# Patient Record
Sex: MALE | Race: WHITE | NOT HISPANIC OR LATINO | Employment: FULL TIME | URBAN - NONMETROPOLITAN AREA
[De-identification: names, ages, dates, MRNs, and addresses within clinical notes are randomized per-mention and may not be internally consistent; named-entity substitution may affect disease eponyms.]

---

## 2019-07-03 ENCOUNTER — OFFICE VISIT (OUTPATIENT)
Dept: FAMILY MEDICINE CLINIC | Facility: CLINIC | Age: 59
End: 2019-07-03
Payer: COMMERCIAL

## 2019-07-03 VITALS
SYSTOLIC BLOOD PRESSURE: 140 MMHG | BODY MASS INDEX: 36.49 KG/M2 | DIASTOLIC BLOOD PRESSURE: 76 MMHG | WEIGHT: 293.5 LBS | HEIGHT: 75 IN

## 2019-07-03 DIAGNOSIS — K21.9 GASTROESOPHAGEAL REFLUX DISEASE WITHOUT ESOPHAGITIS: ICD-10-CM

## 2019-07-03 DIAGNOSIS — I10 ESSENTIAL HYPERTENSION, BENIGN: Primary | ICD-10-CM

## 2019-07-03 DIAGNOSIS — Z12.11 COLON CANCER SCREENING: ICD-10-CM

## 2019-07-03 DIAGNOSIS — R35.1 NOCTURIA: ICD-10-CM

## 2019-07-03 DIAGNOSIS — R73.01 IMPAIRED FASTING GLUCOSE: ICD-10-CM

## 2019-07-03 DIAGNOSIS — E78.5 DYSLIPIDEMIA: ICD-10-CM

## 2019-07-03 DIAGNOSIS — M51.36 LUMBAR DEGENERATIVE DISC DISEASE: ICD-10-CM

## 2019-07-03 PROBLEM — M51.369 LUMBAR DEGENERATIVE DISC DISEASE: Status: ACTIVE | Noted: 2019-07-03

## 2019-07-03 PROCEDURE — 99213 OFFICE O/P EST LOW 20 MIN: CPT | Performed by: FAMILY MEDICINE

## 2019-07-03 RX ORDER — OMEPRAZOLE 20 MG/1
20 TABLET, DELAYED RELEASE ORAL DAILY
COMMUNITY

## 2019-07-03 RX ORDER — AMLODIPINE BESYLATE 2.5 MG/1
2.5 TABLET ORAL DAILY
COMMUNITY
End: 2019-12-10 | Stop reason: SDUPTHER

## 2019-07-03 RX ORDER — LISINOPRIL 40 MG/1
40 TABLET ORAL DAILY
COMMUNITY
End: 2019-09-29 | Stop reason: SDUPTHER

## 2019-07-03 NOTE — PATIENT INSTRUCTIONS
Low-Sodium Diet   AMBULATORY CARE:   A low-sodium diet  limits foods that are high in sodium (salt)  You will need to follow a low-sodium diet if you have high blood pressure, kidney disease, or heart failure  You may also need to follow this diet if you have a condition that is causing your body to retain (hold) extra fluid  You may need to limit the amount of sodium you eat to 1,500 mg  Ask your healthcare provider how much sodium you can have each day  How to use food labels to choose foods that are low in sodium:  Read food labels to find the amount of sodium they contain  The amount of sodium is listed in milligrams (mg)  The % Daily Value (DV) column tells you how much of your daily needs are met by 1 serving of the food for each nutrient listed  Choose foods that have less than 5% of the DV of sodium  These foods are considered low in sodium  Foods that have 20% or more of the DV of sodium are considered high in sodium  Some food labels may also list any of the following terms that tell you about the sodium content in the food:  · Sodium-free:  Less than 5 mg in each serving    · Very low sodium:  35 mg of sodium or less in each serving    · Low sodium:  140 mg of sodium or less in each serving    · Reduced sodium: At least 25% less sodium in each serving than the regular type    · Light in sodium:  50% less sodium in each serving    · Unsalted or no added salt:  No extra salt is added during processing (the food may still contain sodium)  Foods to avoid:  Salty foods are high in sodium   You should avoid the following:  · Processed foods:      ¨ Mixes for cornbread, biscuits, cake, and pudding     ¨ Instant foods, such as potatoes, cereals, noodles, and rice     ¨ Packaged foods, such as bread stuffing, rice and pasta mixes, snack dip mixes, and macaroni and cheese     ¨ Canned foods, such as canned vegetables, soups, broths, sauces, and vegetable or tomato juice    ¨ Snack foods, such as salted chips, popcorn, pretzels, pork rinds, salted crackers, and salted nuts    ¨ Frozen foods, such as dinners, entrees, vegetables with sauces, and breaded meats    ¨ Sauerkraut, pickled vegetables, and other foods prepared in brine    · Meats and cheeses:      ¨ Smoked or cured meat, such as corned beef, millan, ham, hot dogs, and sausage    ¨ Canned meats or spreads, such as potted meats, sardines, anchovies, and imitation seafood    ¨ Deli or lunch meats, such as bologna, ham, turkey, and roast beef    ¨ Processed cheese, such as American cheese and cheese spreads    · Condiments, sauces, and seasonings:      ¨ Salt (¼ teaspoon of salt contains 575 mg of sodium)    ¨ Seasonings made with salt, such as garlic salt, celery salt, onion salt, and seasoned salt    ¨ Regular soy sauce, barbecue sauce, teriyaki sauce, steak sauce, Worcestershire sauce, and most flavored vinegars    ¨ Canned gravy and mixes     ¨ Regular condiments, such as mustard, ketchup, and salad dressings    ¨ Pickles and olives    ¨ Meat tenderizers and monosodium glutamate (MSG)  Foods to include:  Read the food label to find the exact amount of sodium in each serving  · Bread and cereal:  Try to choose breads with less than 80 mg of sodium per serving  ¨ Bread, roll, lauren, tortilla, or unsalted crackers  ¨ Ready-to-eat cereals with less than 5% DV of sodium (examples include shredded wheat and puffed rice)    ¨ Pasta    · Vegetables and fruits:      ¨ Unsalted fresh, frozen, or canned vegetables    ¨ Fresh, frozen, or canned fruits    ¨ Fruit juice    · Dairy:  One serving has about 150 mg of sodium  ¨ Milk, all types    ¨ Yogurt    ¨ Hard cheese, such as cheddar, Swiss, [employer], or mozzarella    · Meat and other protein foods:  Some raw meats may have added sodium       ¨ Plain meats, fish, and poultry     ¨ Eggs    · Other foods:      ¨ Homemade pudding    ¨ Unsalted nuts, popcorn, or pretzels    ¨ Unsalted butter or margarine  Ways to decrease sodium:   · Add spices and herbs to foods instead of salt during cooking  Use salt-free seasonings to add flavor to foods  Examples include onion powder, garlic powder, basil, blevins powder, paprika, and parsley  Try lemon or lime juice or vinegar to give foods a tart flavor  Use hot peppers, pepper, or cayenne pepper to add a spicy flavor to foods  · Do not keep a salt shaker at your kitchen table  This may help keep you from adding salt to food at the table  It may take time to get used to enjoying the natural flavor of food instead of adding salt  Talk to your healthcare provider before you use salt substitutes  Some salt substitutes have a high amount of potassium and need to be avoided if you have kidney disease  · Choose low-sodium foods at restaurants  Meals from restaurants are often high in sodium  Some restaurants have nutrition information on the menu that tells you the amount of sodium in their foods  If possible, ask for your food to be prepared with less, or no salt  · Shop for unsalted or low-sodium foods and snacks at the grocery store  Examples include unsalted or low-sodium broths, soups, and canned vegetables  Choose fresh or frozen vegetables instead  Choose unsalted nuts or seeds or fresh fruits or vegetables as snacks  Read food labels and choose salt-free, very low-sodium, or low-sodium foods  © 2017 [address] Information is for End User's use only and may not be sold, redistributed or otherwise used for commercial purposes  All illustrations and images included in CareNotes® are the copyrighted property of A D A M [employer] Inc  or Marin Ariza  The above information is an  only  It is not intended as medical advice for individual conditions or treatments  Talk to your doctor, nurse or pharmacist before following any medical regimen to see if it is safe and effective for you

## 2019-07-03 NOTE — PROGRESS NOTES
Assessment/Plan:    Essential hypertension, benign  Blood pressure under fair control  Patient has done better on 5 mg of amlodipine but had lower extremity edema which was in time  For now, continue amlodipine 2 5 mg daily and lisinopril 40 mg daily I encouraged the patient to lose weight  I believe if he loses the weight he had gained, he should be able to get his blood pressure down to a reasonable level  Gastroesophageal reflux disease without esophagitis  Patient has gastro reflux disease which is stable on over-the-counter Prilosec  Lumbar degenerative disc disease  Lumbar degenerative disc disease is stable       Diagnoses and all orders for this visit:    Essential hypertension, benign  -     Comprehensive metabolic panel; Future    Colon cancer screening  -     Occult Blood, Fecal, IA; Future    Impaired fasting glucose  -     Comprehensive metabolic panel; Future  -     HEMOGLOBIN A1C W/ EAG ESTIMATION; Future    Dyslipidemia  -     Lipid panel; Future    Nocturia  -     PSA Total, Diagnostic; Future    Gastroesophageal reflux disease without esophagitis    Lumbar degenerative disc disease    Other orders  -     lisinopril (ZESTRIL) 40 mg tablet; Take 40 mg by mouth daily  -     amLODIPine (NORVASC) 2 5 mg tablet; Take 2 5 mg by mouth daily  -     fluticasone (VERAMYST) 27 5 MCG/SPRAY nasal spray; 2 sprays into each nostril daily  -     omeprazole (PriLOSEC OTC) 20 MG tablet; Take 20 mg by mouth daily          I again recommended a screening colonoscopy for the patient but he declined  His main reason is because of transportation I ordered a stool immunoassay  Subjective:      Patient ID: Mari Denny is a 62 y o  male  This patient is a 43-year-old white male presents to the office today for his routine checkup  Doing well and has no complaints  He has changed jobs since I last saw him  He is still doing  he is working less hours now admitted for money    He tells me he is in the process of moving  He purchase to the house  He is struggling with his weight  He is taking his medication as prescribed  The for exercise  His back has been feeling well lately  He has not management now for 3 years  He uses an inversion table for about 5 minutes every evening  The following portions of the patient's history were reviewed and updated as appropriate: allergies, current medications, past family history, past medical history, past social history, past surgical history and problem list     Review of Systems   Respiratory: Negative  Cardiovascular: Negative  Gastrointestinal: Negative  Objective:      /76   Ht 6' 3" (1 905 m)   Wt 133 kg (293 lb 8 oz)   BMI 36 68 kg/m²          Physical Exam   Constitutional:   Patient is a pleasant 59-year-old white male who appears his stated age  He was in no distress  HENT:   Head: Normocephalic and atraumatic  Right Ear: External ear normal    Left Ear: External ear normal    Mouth/Throat: Oropharynx is clear and moist    Tympanic membranes were clear   Eyes: Pupils are equal, round, and reactive to light  Conjunctivae are normal    Neck: Neck supple  No tracheal deviation present  No thyromegaly present  There were no carotid bruits   Cardiovascular: Normal rate, regular rhythm, normal heart sounds and intact distal pulses  Exam reveals no gallop and no friction rub  No murmur heard  Pulmonary/Chest: Effort normal and breath sounds normal  No stridor  No respiratory distress  He has no wheezes  He has no rales  Abdominal: Soft  Bowel sounds are normal  He exhibits no distension and no mass  There is no tenderness  There is no rebound and no guarding  There was no organomegaly present   Lymphadenopathy:     He has no cervical adenopathy  Skin: Skin is warm and dry  No rash noted  Vitals reviewed        Extremities:  Without cyanosis, clubbing, or edema

## 2019-07-04 NOTE — ASSESSMENT & PLAN NOTE
Blood pressure under fair control  Patient has done better on 5 mg of amlodipine but had lower extremity edema which was in time  For now, continue amlodipine 2 5 mg daily and lisinopril 40 mg daily I encouraged the patient to lose weight  I believe if he loses the weight he had gained, he should be able to get his blood pressure down to a reasonable level

## 2019-09-29 DIAGNOSIS — I10 ESSENTIAL HYPERTENSION, BENIGN: Primary | ICD-10-CM

## 2019-09-29 RX ORDER — LISINOPRIL 40 MG/1
TABLET ORAL
Qty: 90 TABLET | Refills: 2 | Status: SHIPPED | OUTPATIENT
Start: 2019-09-29 | End: 2020-05-06 | Stop reason: SDUPTHER

## 2019-12-10 DIAGNOSIS — I10 ESSENTIAL HYPERTENSION, BENIGN: Primary | ICD-10-CM

## 2019-12-10 RX ORDER — AMLODIPINE BESYLATE 2.5 MG/1
TABLET ORAL
Qty: 90 TABLET | Refills: 1 | Status: SHIPPED | OUTPATIENT
Start: 2019-12-10 | End: 2020-05-06 | Stop reason: SDUPTHER

## 2020-05-06 DIAGNOSIS — I10 ESSENTIAL HYPERTENSION, BENIGN: ICD-10-CM

## 2020-05-06 RX ORDER — AMLODIPINE BESYLATE 2.5 MG/1
2.5 TABLET ORAL DAILY
Qty: 90 TABLET | Refills: 0 | Status: SHIPPED | OUTPATIENT
Start: 2020-05-06 | End: 2020-06-08 | Stop reason: DRUGHIGH

## 2020-05-06 RX ORDER — LISINOPRIL 40 MG/1
40 TABLET ORAL DAILY
Qty: 90 TABLET | Refills: 0 | Status: SHIPPED | OUTPATIENT
Start: 2020-05-06 | End: 2020-06-08 | Stop reason: SDUPTHER

## 2020-06-08 ENCOUNTER — OFFICE VISIT (OUTPATIENT)
Dept: FAMILY MEDICINE CLINIC | Facility: CLINIC | Age: 60
End: 2020-06-08
Payer: COMMERCIAL

## 2020-06-08 VITALS
OXYGEN SATURATION: 99 % | DIASTOLIC BLOOD PRESSURE: 82 MMHG | BODY MASS INDEX: 37.1 KG/M2 | SYSTOLIC BLOOD PRESSURE: 140 MMHG | HEIGHT: 75 IN | TEMPERATURE: 97.5 F | WEIGHT: 298.4 LBS | HEART RATE: 75 BPM

## 2020-06-08 DIAGNOSIS — Z12.5 PROSTATE CANCER SCREENING: ICD-10-CM

## 2020-06-08 DIAGNOSIS — Z12.11 COLON CANCER SCREENING: ICD-10-CM

## 2020-06-08 DIAGNOSIS — E78.5 DYSLIPIDEMIA: ICD-10-CM

## 2020-06-08 DIAGNOSIS — R53.83 OTHER FATIGUE: ICD-10-CM

## 2020-06-08 DIAGNOSIS — R73.01 IMPAIRED FASTING GLUCOSE: ICD-10-CM

## 2020-06-08 DIAGNOSIS — I10 ESSENTIAL HYPERTENSION, BENIGN: Primary | ICD-10-CM

## 2020-06-08 PROCEDURE — 1036F TOBACCO NON-USER: CPT | Performed by: FAMILY MEDICINE

## 2020-06-08 PROCEDURE — 3077F SYST BP >= 140 MM HG: CPT | Performed by: FAMILY MEDICINE

## 2020-06-08 PROCEDURE — 99213 OFFICE O/P EST LOW 20 MIN: CPT | Performed by: FAMILY MEDICINE

## 2020-06-08 PROCEDURE — 3079F DIAST BP 80-89 MM HG: CPT | Performed by: FAMILY MEDICINE

## 2020-06-08 PROCEDURE — 3008F BODY MASS INDEX DOCD: CPT | Performed by: FAMILY MEDICINE

## 2020-06-08 RX ORDER — LISINOPRIL 40 MG/1
40 TABLET ORAL DAILY
Qty: 90 TABLET | Refills: 2 | Status: SHIPPED | OUTPATIENT
Start: 2020-06-08 | End: 2021-02-19 | Stop reason: SDUPTHER

## 2020-06-08 RX ORDER — AMLODIPINE BESYLATE 5 MG/1
5 TABLET ORAL DAILY
Qty: 90 TABLET | Refills: 2 | Status: SHIPPED | OUTPATIENT
Start: 2020-06-08 | End: 2020-12-07 | Stop reason: SDUPTHER

## 2020-09-04 ENCOUNTER — APPOINTMENT (OUTPATIENT)
Dept: LAB | Facility: HOSPITAL | Age: 60
End: 2020-09-04
Payer: COMMERCIAL

## 2020-09-04 DIAGNOSIS — Z12.5 PROSTATE CANCER SCREENING: ICD-10-CM

## 2020-09-04 DIAGNOSIS — R73.01 IMPAIRED FASTING GLUCOSE: ICD-10-CM

## 2020-09-04 DIAGNOSIS — E78.5 DYSLIPIDEMIA: ICD-10-CM

## 2020-09-04 DIAGNOSIS — R53.83 OTHER FATIGUE: ICD-10-CM

## 2020-09-04 LAB
ALBUMIN SERPL BCP-MCNC: 4 G/DL (ref 3.5–5)
ALP SERPL-CCNC: 61 U/L (ref 46–116)
ALT SERPL W P-5'-P-CCNC: 59 U/L (ref 12–78)
ANION GAP SERPL CALCULATED.3IONS-SCNC: 6 MMOL/L (ref 4–13)
AST SERPL W P-5'-P-CCNC: 47 U/L (ref 5–45)
BILIRUB SERPL-MCNC: 1.48 MG/DL (ref 0.2–1)
BUN SERPL-MCNC: 15 MG/DL (ref 5–25)
CALCIUM SERPL-MCNC: 9.6 MG/DL (ref 8.3–10.1)
CHLORIDE SERPL-SCNC: 104 MMOL/L (ref 100–108)
CHOLEST SERPL-MCNC: 163 MG/DL (ref 50–200)
CO2 SERPL-SCNC: 30 MMOL/L (ref 21–32)
CREAT SERPL-MCNC: 1.07 MG/DL (ref 0.6–1.3)
EST. AVERAGE GLUCOSE BLD GHB EST-MCNC: 123 MG/DL
GFR SERPL CREATININE-BSD FRML MDRD: 75 ML/MIN/1.73SQ M
GLUCOSE P FAST SERPL-MCNC: 133 MG/DL (ref 65–99)
HBA1C MFR BLD: 5.9 %
HDLC SERPL-MCNC: 30 MG/DL
LDLC SERPL CALC-MCNC: 111 MG/DL (ref 0–100)
NONHDLC SERPL-MCNC: 133 MG/DL
POTASSIUM SERPL-SCNC: 4.3 MMOL/L (ref 3.5–5.3)
PROT SERPL-MCNC: 7.6 G/DL (ref 6.4–8.2)
PSA SERPL-MCNC: 1.8 NG/ML (ref 0–4)
SODIUM SERPL-SCNC: 140 MMOL/L (ref 136–145)
TRIGL SERPL-MCNC: 110 MG/DL
TSH SERPL DL<=0.05 MIU/L-ACNC: 1.89 UIU/ML (ref 0.36–3.74)

## 2020-09-04 PROCEDURE — 80061 LIPID PANEL: CPT

## 2020-09-04 PROCEDURE — 83036 HEMOGLOBIN GLYCOSYLATED A1C: CPT

## 2020-09-04 PROCEDURE — 84443 ASSAY THYROID STIM HORMONE: CPT

## 2020-09-04 PROCEDURE — 36415 COLL VENOUS BLD VENIPUNCTURE: CPT

## 2020-09-04 PROCEDURE — G0103 PSA SCREENING: HCPCS

## 2020-09-04 PROCEDURE — 80053 COMPREHEN METABOLIC PANEL: CPT

## 2020-12-07 DIAGNOSIS — I10 ESSENTIAL HYPERTENSION, BENIGN: ICD-10-CM

## 2020-12-07 RX ORDER — AMLODIPINE BESYLATE 5 MG/1
5 TABLET ORAL DAILY
Qty: 90 TABLET | Refills: 3 | Status: SHIPPED | OUTPATIENT
Start: 2020-12-07 | End: 2021-11-09

## 2020-12-11 ENCOUNTER — OFFICE VISIT (OUTPATIENT)
Dept: FAMILY MEDICINE CLINIC | Facility: CLINIC | Age: 60
End: 2020-12-11
Payer: COMMERCIAL

## 2020-12-11 VITALS
TEMPERATURE: 97.5 F | WEIGHT: 305.4 LBS | SYSTOLIC BLOOD PRESSURE: 142 MMHG | HEART RATE: 80 BPM | DIASTOLIC BLOOD PRESSURE: 78 MMHG | OXYGEN SATURATION: 93 % | HEIGHT: 75 IN | BODY MASS INDEX: 37.97 KG/M2

## 2020-12-11 DIAGNOSIS — E78.5 DYSLIPIDEMIA: ICD-10-CM

## 2020-12-11 DIAGNOSIS — R73.01 IMPAIRED FASTING GLUCOSE: ICD-10-CM

## 2020-12-11 DIAGNOSIS — I10 ESSENTIAL HYPERTENSION, BENIGN: Primary | ICD-10-CM

## 2020-12-11 DIAGNOSIS — Z12.11 COLON CANCER SCREENING: ICD-10-CM

## 2020-12-11 PROCEDURE — 99213 OFFICE O/P EST LOW 20 MIN: CPT | Performed by: FAMILY MEDICINE

## 2021-02-19 DIAGNOSIS — I10 ESSENTIAL HYPERTENSION, BENIGN: ICD-10-CM

## 2021-02-19 RX ORDER — LISINOPRIL 40 MG/1
40 TABLET ORAL DAILY
Qty: 90 TABLET | Refills: 2 | Status: SHIPPED | OUTPATIENT
Start: 2021-02-19 | End: 2021-10-25

## 2021-04-08 ENCOUNTER — TELEPHONE (OUTPATIENT)
Dept: FAMILY MEDICINE CLINIC | Facility: CLINIC | Age: 61
End: 2021-04-08

## 2021-04-08 NOTE — TELEPHONE ENCOUNTER
Called patient regarding over due labs  Left message for patient to have labs done sometime in the next few days

## 2021-06-05 ENCOUNTER — RA CDI HCC (OUTPATIENT)
Dept: OTHER | Facility: HOSPITAL | Age: 61
End: 2021-06-05

## 2021-06-05 NOTE — PROGRESS NOTES
[facility] 75  coding opportunities          Chart reviewed, no opportunity found: CHART REVIEWED, NO OPPORTUNITY FOUND              Patients insurance company:  [employer] [facility] (Medicare Advantage and Commercial)

## 2021-06-11 ENCOUNTER — OFFICE VISIT (OUTPATIENT)
Dept: FAMILY MEDICINE CLINIC | Facility: CLINIC | Age: 61
End: 2021-06-11
Payer: COMMERCIAL

## 2021-06-11 VITALS
HEART RATE: 77 BPM | OXYGEN SATURATION: 98 % | WEIGHT: 302.8 LBS | SYSTOLIC BLOOD PRESSURE: 136 MMHG | HEIGHT: 75 IN | TEMPERATURE: 97.2 F | BODY MASS INDEX: 37.65 KG/M2 | DIASTOLIC BLOOD PRESSURE: 78 MMHG

## 2021-06-11 DIAGNOSIS — N52.9 IMPOTENCE OF ORGANIC ORIGIN: ICD-10-CM

## 2021-06-11 DIAGNOSIS — N40.1 BENIGN PROSTATIC HYPERPLASIA WITH NOCTURIA: ICD-10-CM

## 2021-06-11 DIAGNOSIS — J30.1 SEASONAL ALLERGIC RHINITIS DUE TO POLLEN: ICD-10-CM

## 2021-06-11 DIAGNOSIS — R35.1 BENIGN PROSTATIC HYPERPLASIA WITH NOCTURIA: ICD-10-CM

## 2021-06-11 DIAGNOSIS — R73.01 IMPAIRED FASTING GLUCOSE: ICD-10-CM

## 2021-06-11 DIAGNOSIS — I10 ESSENTIAL HYPERTENSION, BENIGN: Primary | ICD-10-CM

## 2021-06-11 PROCEDURE — 99214 OFFICE O/P EST MOD 30 MIN: CPT | Performed by: FAMILY MEDICINE

## 2021-06-11 PROCEDURE — 3008F BODY MASS INDEX DOCD: CPT | Performed by: FAMILY MEDICINE

## 2021-06-11 PROCEDURE — 3725F SCREEN DEPRESSION PERFORMED: CPT | Performed by: FAMILY MEDICINE

## 2021-06-11 PROCEDURE — 3078F DIAST BP <80 MM HG: CPT | Performed by: FAMILY MEDICINE

## 2021-06-11 PROCEDURE — 1036F TOBACCO NON-USER: CPT | Performed by: FAMILY MEDICINE

## 2021-06-11 PROCEDURE — 3075F SYST BP GE 130 - 139MM HG: CPT | Performed by: FAMILY MEDICINE

## 2021-06-11 RX ORDER — SILDENAFIL 100 MG/1
100 TABLET, FILM COATED ORAL DAILY PRN
Qty: 10 TABLET | Refills: 3 | Status: SHIPPED | OUTPATIENT
Start: 2021-06-11

## 2021-06-11 NOTE — PATIENT INSTRUCTIONS
Low-Sodium Diet   AMBULATORY CARE:   A low-sodium diet  limits foods that are high in sodium (salt)  You will need to follow a low-sodium diet if you have high blood pressure, kidney disease, or heart failure  You may also need to follow this diet if you have a condition that is causing your body to retain (hold) extra fluid  You may need to limit the amount of sodium you eat in a day to 1,500 to 2,000 mg  Ask your healthcare provider how much sodium you can have each day  How to use food labels to choose foods that are low in sodium:  Read food labels to find the amount of sodium they contain  The amount of sodium is listed in milligrams (mg)  The % Daily Value (DV) column tells you how much of your daily needs are met by 1 serving of the food for each nutrient listed  Choose foods that have less than 5% of the DV of sodium  These foods are considered low in sodium  Foods that have 20% or more of the DV of sodium are considered high in sodium  Some food labels may also list any of the following terms that tell you about the sodium content in the food:  · Sodium-free:  Less than 5 mg in each serving    · Very low sodium:  35 mg of sodium or less in each serving    · Low sodium:  140 mg of sodium or less in each serving    · Reduced sodium: At least 25% less sodium in each serving than the regular type    · Light in sodium:  50% less sodium in each serving    · Unsalted or no added salt:  No extra salt is added during processing (the food may still contain sodium)     Foods to avoid:  Salty foods are high in sodium  You should avoid the following:  · Processed foods:      ? Mixes for cornbread, biscuits, cake, and pudding     ? Instant foods, such as potatoes, cereals, noodles, and rice     ? Packaged foods, such as bread stuffing, rice and pasta mixes, snack dip mixes, and macaroni and cheese     ? Canned foods, such as canned vegetables, soups, broths, sauces, and vegetable or tomato juice    ?  Snack foods, such as salted chips, popcorn, pretzels, pork rinds, salted crackers, and salted nuts    ? Frozen foods, such as dinners, entrees, vegetables with sauces, and breaded meats    ? Sauerkraut, pickled vegetables, and other foods prepared in brine    · Meats and cheeses:      ? Smoked or cured meat, such as corned beef, millan, ham, hot dogs, and sausage    ? Canned meats or spreads, such as potted meats, sardines, anchovies, and imitation seafood    ? Deli or lunch meats, such as bologna, ham, turkey, and roast beef    ? Processed cheese, such as American cheese and cheese spreads    · Condiments, sauces, and seasonings:      ? Salt (¼ teaspoon of salt contains 575 mg of sodium)    ? Seasonings made with salt, such as garlic salt, celery salt, onion salt, and seasoned salt    ? Regular soy sauce, barbecue sauce, teriyaki sauce, steak sauce, Worcestershire sauce, and most flavored vinegars    ? Canned gravy and mixes     ? Regular condiments, such as mustard, ketchup, and salad dressings    ? Pickles and olives    ? Meat tenderizers and monosodium glutamate (MSG)    Foods to include:  Read the food label to find the exact amount of sodium in each serving  · Bread and cereal:  Try to choose breads with less than 80 mg of sodium per serving  ? Bread, roll, lauren, tortilla, or unsalted crackers  ? Ready-to-eat cereals with less than 5% DV of sodium (examples include shredded wheat and puffed rice)    ? Pasta    · Vegetables and fruits:      ? Unsalted fresh, frozen, or canned vegetables    ? Fresh, frozen, or canned fruits    ? Fruit juice    · Dairy:  One serving has about 150 mg of sodium  ? Milk, all types    ? Yogurt    ? Hard cheese, such as cheddar, Swiss, [employer], or mozzarella    · Meat and other protein foods:  Some raw meats may have added sodium  ? Plain meats, fish, and poultry     ? Eggs    · Other foods:      ? Homemade pudding    ? Unsalted nuts, popcorn, or pretzels    ?  Unsalted butter or margarine    Ways to decrease sodium:   · Add spices and herbs to foods instead of salt during cooking  Use salt-free seasonings to add flavor to foods  Examples include onion powder, garlic powder, basil, blevins powder, paprika, and parsley  Try lemon or lime juice or vinegar to give foods a tart flavor  Use hot peppers, pepper, or cayenne pepper to add a spicy flavor  · Do not keep a salt shaker at your kitchen table  This may help keep you from adding salt to food at the table  A teaspoon of salt has 2,300 mg of sodium  It may take time to get used to enjoying the natural flavor of food instead of adding salt  Talk to your healthcare provider before you use salt substitutes  Some salt substitutes have a high amount of potassium and need to be avoided if you have kidney disease  · Choose low-sodium foods at restaurants  Meals from restaurants are often high in sodium  Some restaurants have nutrition information on the menu that tells you the amount of sodium in their foods  If possible, ask for your food to be prepared with less, or no salt  · Shop for unsalted or low-sodium foods and snacks at the grocery store  Examples include unsalted or low-sodium broths, soups, and canned vegetables  Choose fresh or frozen vegetables instead  Choose unsalted nuts or seeds or fresh fruits or vegetables as snacks  Read food labels and choose salt-free, very low-sodium, or low-sodium foods  © Copyright [address] Information is for End User's use only and may not be sold, redistributed or otherwise used for commercial purposes  All illustrations and images included in CareNotes® are the copyrighted property of A D A M  Inc  or [facility] Demond Singletary   The above information is an  only  It is not intended as medical advice for individual conditions or treatments  Talk to your doctor, nurse or pharmacist before following any medical regimen to see if it is safe and effective for you

## 2021-06-11 NOTE — ASSESSMENT & PLAN NOTE
I asked the patient to get the blood work done that I previously ordered a soon as possible  I also reminded him that he needs to do his Cologuard

## 2021-06-11 NOTE — ASSESSMENT & PLAN NOTE
Patient has BPH  He reports his symptoms are mild  I explained the diagnosis to him  At this time, he does not want any medication  If it should worsen, I did tell him that there are medications which can help him  Now that he  Will be taking sildenafil, that will limit our choices

## 2021-06-11 NOTE — ASSESSMENT & PLAN NOTE
Patient has erectile dysfunction secondary to longstanding hypertension  I prescribed sildenafil 100 mg  He can take 1/2 to 1 tablet 1 hour prior  I explained to him how to take the medication properly

## 2021-06-11 NOTE — PROGRESS NOTES
Assessment/Plan:    Essential hypertension, benign    I checked the patient's blood pressure myself and found to be 136/78  He will continue amlodipine 5 mg daily and lisinopril 40 mg daily  I asked the patient to follow a low-salt diet and try to lose weight  He did lose 3 lb since his last visit  He needs to get active again and try to do some walking  Impaired fasting glucose    I asked the patient to get the blood work done that I previously ordered a soon as possible  I also reminded him that he needs to do his Cologuard  Impotence of organic origin    Patient has erectile dysfunction secondary to longstanding hypertension  I prescribed sildenafil 100 mg  He can take 1/2 to 1 tablet 1 hour prior  I explained to him how to take the medication properly  Seasonal allergic rhinitis due to pollen    I renewed the patient's prescription for Veramyst   Pollen has been very high lately and patient is using the nasal spray to control his symptoms  Benign prostatic hyperplasia with nocturia    Patient has BPH  He reports his symptoms are mild  I explained the diagnosis to him  At this time, he does not want any medication  If it should worsen, I did tell him that there are medications which can help him  Now that he  Will be taking sildenafil, that will limit our choices  Diagnoses and all orders for this visit:    Essential hypertension, benign    Seasonal allergic rhinitis due to pollen  -     fluticasone (VERAMYST) 27 5 MCG/SPRAY nasal spray; 2 sprays into each nostril daily    Impotence of organic origin  -     sildenafil (VIAGRA) 100 mg tablet; Take 1 tablet (100 mg total) by mouth daily as needed for erectile dysfunction    Impaired fasting glucose    Benign prostatic hyperplasia with nocturia          Subjective:      Patient ID: Susan Do is a 61 y o  male  This patient is a 27-year-old white male who presents to the office today for his routine checkup    He has not been exercising  He is working from home and reports dietary indiscretion  He finds it more difficult to watch his diet when we working from home  He did not have any labs done prior to the visit  He did not do his Cologuard  The following portions of the patient's history were reviewed and updated as appropriate: allergies, current medications, past family history, past medical history, past social history, past surgical history and problem list     Review of Systems   Constitutional: Negative for activity change, appetite change and unexpected weight change  HENT:          Reports itchy ears   Respiratory: Negative for cough, shortness of breath and wheezing  Cardiovascular: Negative for chest pain, palpitations and leg swelling  Gastrointestinal: Negative for abdominal distention, abdominal pain, blood in stool, constipation, diarrhea and nausea  Genitourinary:          Reports nocturia and dribbling now, only at night  Both of these symptoms are new  Reports erectile dysfunction  Apparently had issues for a while but has gotten worse   Allergic/Immunologic: Positive for environmental allergies  Objective:      /78   Pulse 77   Temp (!) 97 2 °F (36 2 °C) (Temporal)   Ht 6' 3" (1 905 m)   Wt (!) 137 kg (302 lb 12 8 oz)   SpO2 98%   BMI 37 85 kg/m²          Physical Exam  Vitals signs reviewed  Constitutional:       Comments: This is a pleasant 61-year-old white male who appears his stated age  He is in no apparent distress   HENT:      Head: Normocephalic and atraumatic  Right Ear: Tympanic membrane, ear canal and external ear normal  There is no impacted cerumen  Left Ear: Tympanic membrane, ear canal and external ear normal  There is no impacted cerumen  Mouth/Throat:      Mouth: Mucous membranes are moist       Pharynx: Oropharynx is clear  No oropharyngeal exudate or posterior oropharyngeal erythema  Eyes:      General: No scleral icterus          Right eye: No discharge  Left eye: No discharge  Conjunctiva/sclera: Conjunctivae normal       Pupils: Pupils are equal, round, and reactive to light  Neck:      Musculoskeletal: Neck supple  Vascular: No carotid bruit  Comments: There is no thyromegaly  Cardiovascular:      Rate and Rhythm: Normal rate and regular rhythm  Heart sounds: Normal heart sounds  No murmur  No friction rub  No gallop  Pulmonary:      Effort: Pulmonary effort is normal  No respiratory distress  Breath sounds: Normal breath sounds  No stridor  No wheezing, rhonchi or rales  Abdominal:      General: Bowel sounds are normal  There is no distension  Palpations: Abdomen is soft  There is no mass  Tenderness: There is no abdominal tenderness  There is no guarding  Hernia: No hernia is present  Comments: There is no organomegaly   Genitourinary:     Comments:  Rectal exam was performed  Prostate was move with no nodules  It was firm  It was enlarged, grade 2  There were no rectal masses  Stool was brown and heme-negative  Lymphadenopathy:      Cervical: No cervical adenopathy  Psychiatric:         Mood and Affect: Mood normal          Behavior: Behavior normal          Thought Content:  Thought content normal          Judgment: Judgment normal        Extremities:  Without cyanosis, clubbing, or edema

## 2021-06-11 NOTE — ASSESSMENT & PLAN NOTE
I renewed the patient's prescription for Veramyst   Pollen has been very high lately and patient is using the nasal spray to control his symptoms

## 2021-06-11 NOTE — ASSESSMENT & PLAN NOTE
I checked the patient's blood pressure myself and found to be 136/78  He will continue amlodipine 5 mg daily and lisinopril 40 mg daily  I asked the patient to follow a low-salt diet and try to lose weight  He did lose 3 lb since his last visit  He needs to get active again and try to do some walking

## 2021-06-14 ENCOUNTER — TELEPHONE (OUTPATIENT)
Dept: FAMILY MEDICINE CLINIC | Facility: CLINIC | Age: 61
End: 2021-06-14

## 2021-06-14 DIAGNOSIS — J30.1 SEASONAL ALLERGIC RHINITIS DUE TO POLLEN: Primary | ICD-10-CM

## 2021-06-14 RX ORDER — FLUTICASONE PROPIONATE 50 MCG
2 SPRAY, SUSPENSION (ML) NASAL DAILY
Qty: 48 G | Refills: 3 | Status: SHIPPED | OUTPATIENT
Start: 2021-06-14

## 2021-06-14 NOTE — TELEPHONE ENCOUNTER
THE PHARMACY SENT A PAPER FOR THE PT'S VERAMYST NASAL SPRAY  THEY STATE IT IS NO LONGER MANUFACTURED AND WILL NEED A REPLACEMENT SENT IN  PLEASE SEND TO [facility] FOR A 90 DAY SUPPLY

## 2021-06-22 ENCOUNTER — TELEPHONE (OUTPATIENT)
Dept: FAMILY MEDICINE CLINIC | Facility: CLINIC | Age: 61
End: 2021-06-22

## 2021-10-25 DIAGNOSIS — I10 ESSENTIAL HYPERTENSION, BENIGN: ICD-10-CM

## 2021-10-25 RX ORDER — LISINOPRIL 40 MG/1
TABLET ORAL
Qty: 90 TABLET | Refills: 3 | Status: SHIPPED | OUTPATIENT
Start: 2021-10-25

## 2021-11-09 DIAGNOSIS — I10 ESSENTIAL HYPERTENSION, BENIGN: ICD-10-CM

## 2021-11-09 RX ORDER — AMLODIPINE BESYLATE 5 MG/1
TABLET ORAL
Qty: 90 TABLET | Refills: 3 | Status: SHIPPED | OUTPATIENT
Start: 2021-11-09

## 2021-12-10 ENCOUNTER — OFFICE VISIT (OUTPATIENT)
Dept: FAMILY MEDICINE CLINIC | Facility: CLINIC | Age: 61
End: 2021-12-10
Payer: COMMERCIAL

## 2021-12-10 VITALS
WEIGHT: 304.8 LBS | HEART RATE: 78 BPM | DIASTOLIC BLOOD PRESSURE: 100 MMHG | HEIGHT: 75 IN | BODY MASS INDEX: 37.9 KG/M2 | SYSTOLIC BLOOD PRESSURE: 160 MMHG | TEMPERATURE: 97 F | OXYGEN SATURATION: 96 %

## 2021-12-10 DIAGNOSIS — Z12.5 PROSTATE CANCER SCREENING: ICD-10-CM

## 2021-12-10 DIAGNOSIS — I10 ESSENTIAL HYPERTENSION, BENIGN: Primary | ICD-10-CM

## 2021-12-10 DIAGNOSIS — Z23 ENCOUNTER FOR IMMUNIZATION: ICD-10-CM

## 2021-12-10 DIAGNOSIS — H93.13 TINNITUS OF BOTH EARS: ICD-10-CM

## 2021-12-10 DIAGNOSIS — R53.83 OTHER FATIGUE: ICD-10-CM

## 2021-12-10 DIAGNOSIS — E78.5 DYSLIPIDEMIA: ICD-10-CM

## 2021-12-10 DIAGNOSIS — R73.01 IMPAIRED FASTING GLUCOSE: ICD-10-CM

## 2021-12-10 DIAGNOSIS — J30.1 SEASONAL ALLERGIC RHINITIS DUE TO POLLEN: ICD-10-CM

## 2021-12-10 PROCEDURE — 90682 RIV4 VACC RECOMBINANT DNA IM: CPT

## 2021-12-10 PROCEDURE — 3077F SYST BP >= 140 MM HG: CPT | Performed by: FAMILY MEDICINE

## 2021-12-10 PROCEDURE — 1036F TOBACCO NON-USER: CPT | Performed by: FAMILY MEDICINE

## 2021-12-10 PROCEDURE — 3008F BODY MASS INDEX DOCD: CPT | Performed by: FAMILY MEDICINE

## 2021-12-10 PROCEDURE — 99214 OFFICE O/P EST MOD 30 MIN: CPT | Performed by: FAMILY MEDICINE

## 2021-12-10 PROCEDURE — 3080F DIAST BP >= 90 MM HG: CPT | Performed by: FAMILY MEDICINE

## 2021-12-10 PROCEDURE — 90471 IMMUNIZATION ADMIN: CPT

## 2021-12-10 RX ORDER — HYDROCHLOROTHIAZIDE 25 MG/1
25 TABLET ORAL DAILY
Qty: 90 TABLET | Refills: 3 | Status: SHIPPED | OUTPATIENT
Start: 2021-12-10

## 2022-05-26 ENCOUNTER — APPOINTMENT (OUTPATIENT)
Dept: LAB | Facility: HOSPITAL | Age: 62
End: 2022-05-26
Payer: COMMERCIAL

## 2022-05-26 DIAGNOSIS — Z12.5 PROSTATE CANCER SCREENING: ICD-10-CM

## 2022-05-26 DIAGNOSIS — E78.5 DYSLIPIDEMIA: ICD-10-CM

## 2022-05-26 DIAGNOSIS — I10 ESSENTIAL HYPERTENSION, BENIGN: ICD-10-CM

## 2022-05-26 DIAGNOSIS — R53.83 OTHER FATIGUE: ICD-10-CM

## 2022-05-26 LAB
ALBUMIN SERPL BCP-MCNC: 4.1 G/DL (ref 3.5–5)
ALP SERPL-CCNC: 68 U/L (ref 46–116)
ALT SERPL W P-5'-P-CCNC: 62 U/L (ref 12–78)
ANION GAP SERPL CALCULATED.3IONS-SCNC: 8 MMOL/L (ref 4–13)
AST SERPL W P-5'-P-CCNC: 55 U/L (ref 5–45)
BASOPHILS # BLD AUTO: 0.05 THOUSANDS/ΜL (ref 0–0.1)
BASOPHILS NFR BLD AUTO: 1 % (ref 0–1)
BILIRUB SERPL-MCNC: 1.8 MG/DL (ref 0.2–1)
BUN SERPL-MCNC: 13 MG/DL (ref 5–25)
CALCIUM SERPL-MCNC: 9.5 MG/DL (ref 8.3–10.1)
CHLORIDE SERPL-SCNC: 101 MMOL/L (ref 100–108)
CHOLEST SERPL-MCNC: 154 MG/DL
CO2 SERPL-SCNC: 29 MMOL/L (ref 21–32)
CREAT SERPL-MCNC: 1.03 MG/DL (ref 0.6–1.3)
EOSINOPHIL # BLD AUTO: 0.21 THOUSAND/ΜL (ref 0–0.61)
EOSINOPHIL NFR BLD AUTO: 3 % (ref 0–6)
ERYTHROCYTE [DISTWIDTH] IN BLOOD BY AUTOMATED COUNT: 12.4 % (ref 11.6–15.1)
EST. AVERAGE GLUCOSE BLD GHB EST-MCNC: 177 MG/DL
GFR SERPL CREATININE-BSD FRML MDRD: 78 ML/MIN/1.73SQ M
GLUCOSE P FAST SERPL-MCNC: 173 MG/DL (ref 65–99)
HBA1C MFR BLD: 7.8 %
HCT VFR BLD AUTO: 43.8 % (ref 36.5–49.3)
HDLC SERPL-MCNC: 28 MG/DL
HGB BLD-MCNC: 15.2 G/DL (ref 12–17)
IMM GRANULOCYTES # BLD AUTO: 0.03 THOUSAND/UL (ref 0–0.2)
IMM GRANULOCYTES NFR BLD AUTO: 0 % (ref 0–2)
LDLC SERPL CALC-MCNC: 97 MG/DL (ref 0–100)
LYMPHOCYTES # BLD AUTO: 1.72 THOUSANDS/ΜL (ref 0.6–4.47)
LYMPHOCYTES NFR BLD AUTO: 25 % (ref 14–44)
MCH RBC QN AUTO: 31.5 PG (ref 26.8–34.3)
MCHC RBC AUTO-ENTMCNC: 34.7 G/DL (ref 31.4–37.4)
MCV RBC AUTO: 91 FL (ref 82–98)
MONOCYTES # BLD AUTO: 0.57 THOUSAND/ΜL (ref 0.17–1.22)
MONOCYTES NFR BLD AUTO: 8 % (ref 4–12)
NEUTROPHILS # BLD AUTO: 4.39 THOUSANDS/ΜL (ref 1.85–7.62)
NEUTS SEG NFR BLD AUTO: 63 % (ref 43–75)
NONHDLC SERPL-MCNC: 126 MG/DL
NRBC BLD AUTO-RTO: 0 /100 WBCS
PLATELET # BLD AUTO: 167 THOUSANDS/UL (ref 149–390)
PMV BLD AUTO: 10.2 FL (ref 8.9–12.7)
POTASSIUM SERPL-SCNC: 3.8 MMOL/L (ref 3.5–5.3)
PROT SERPL-MCNC: 7.9 G/DL (ref 6.4–8.2)
PSA SERPL-MCNC: 2.6 NG/ML (ref 0–4)
RBC # BLD AUTO: 4.82 MILLION/UL (ref 3.88–5.62)
SODIUM SERPL-SCNC: 138 MMOL/L (ref 136–145)
TRIGL SERPL-MCNC: 146 MG/DL
TSH SERPL DL<=0.05 MIU/L-ACNC: 2.03 UIU/ML (ref 0.45–4.5)
WBC # BLD AUTO: 6.97 THOUSAND/UL (ref 4.31–10.16)

## 2022-05-26 PROCEDURE — 80061 LIPID PANEL: CPT

## 2022-05-26 PROCEDURE — 36415 COLL VENOUS BLD VENIPUNCTURE: CPT | Performed by: FAMILY MEDICINE

## 2022-05-26 PROCEDURE — 83036 HEMOGLOBIN GLYCOSYLATED A1C: CPT | Performed by: FAMILY MEDICINE

## 2022-05-26 PROCEDURE — 85025 COMPLETE CBC W/AUTO DIFF WBC: CPT

## 2022-05-26 PROCEDURE — G0103 PSA SCREENING: HCPCS

## 2022-05-26 PROCEDURE — 84443 ASSAY THYROID STIM HORMONE: CPT

## 2022-05-26 PROCEDURE — 80053 COMPREHEN METABOLIC PANEL: CPT

## 2022-06-10 ENCOUNTER — OFFICE VISIT (OUTPATIENT)
Dept: FAMILY MEDICINE CLINIC | Facility: CLINIC | Age: 62
End: 2022-06-10
Payer: COMMERCIAL

## 2022-06-10 VITALS
WEIGHT: 295.3 LBS | DIASTOLIC BLOOD PRESSURE: 80 MMHG | TEMPERATURE: 95.9 F | BODY MASS INDEX: 36.72 KG/M2 | SYSTOLIC BLOOD PRESSURE: 138 MMHG | HEIGHT: 75 IN | OXYGEN SATURATION: 100 % | HEART RATE: 83 BPM

## 2022-06-10 DIAGNOSIS — E11.9 TYPE 2 DIABETES MELLITUS WITHOUT COMPLICATION, WITHOUT LONG-TERM CURRENT USE OF INSULIN (HCC): Primary | ICD-10-CM

## 2022-06-10 DIAGNOSIS — I10 ESSENTIAL HYPERTENSION, BENIGN: ICD-10-CM

## 2022-06-10 DIAGNOSIS — N40.1 BENIGN PROSTATIC HYPERPLASIA WITH NOCTURIA: ICD-10-CM

## 2022-06-10 DIAGNOSIS — R35.1 BENIGN PROSTATIC HYPERPLASIA WITH NOCTURIA: ICD-10-CM

## 2022-06-10 DIAGNOSIS — J30.1 SEASONAL ALLERGIC RHINITIS DUE TO POLLEN: ICD-10-CM

## 2022-06-10 DIAGNOSIS — E78.5 DYSLIPIDEMIA: ICD-10-CM

## 2022-06-10 PROBLEM — R73.01 IMPAIRED FASTING GLUCOSE: Status: RESOLVED | Noted: 2020-06-08 | Resolved: 2022-06-10

## 2022-06-10 PROCEDURE — 1036F TOBACCO NON-USER: CPT | Performed by: FAMILY MEDICINE

## 2022-06-10 PROCEDURE — 3079F DIAST BP 80-89 MM HG: CPT | Performed by: FAMILY MEDICINE

## 2022-06-10 PROCEDURE — 99214 OFFICE O/P EST MOD 30 MIN: CPT | Performed by: FAMILY MEDICINE

## 2022-06-10 PROCEDURE — 3008F BODY MASS INDEX DOCD: CPT | Performed by: FAMILY MEDICINE

## 2022-06-10 PROCEDURE — 3075F SYST BP GE 130 - 139MM HG: CPT | Performed by: FAMILY MEDICINE

## 2022-06-10 RX ORDER — LANCETS 33 GAUGE
EACH MISCELLANEOUS
Qty: 100 EACH | Refills: 3 | Status: SHIPPED | OUTPATIENT
Start: 2022-06-10

## 2022-06-10 RX ORDER — BLOOD SUGAR DIAGNOSTIC
STRIP MISCELLANEOUS
Qty: 100 EACH | Refills: 3 | Status: SHIPPED | OUTPATIENT
Start: 2022-06-10

## 2022-06-10 RX ORDER — BLOOD-GLUCOSE METER
KIT MISCELLANEOUS
Qty: 1 KIT | Refills: 0 | Status: SHIPPED | OUTPATIENT
Start: 2022-06-10

## 2022-06-10 RX ORDER — ROSUVASTATIN CALCIUM 5 MG/1
5 TABLET, COATED ORAL DAILY
Qty: 90 TABLET | Refills: 3 | Status: SHIPPED | OUTPATIENT
Start: 2022-06-10

## 2022-06-10 NOTE — ASSESSMENT & PLAN NOTE
His symptoms have improved with the over-the-counter supplement he is taking  I note that his PSA was 2 6

## 2022-06-10 NOTE — PROGRESS NOTES
Assessment/Plan:    Type 2 diabetes mellitus without complication, without long-term current use of insulin ([facility])    Lab Results   Component Value Date    HGBA1C 7 8 (H) 05/26/2022   Patient has type 2 diabetes mellitus  I reviewed the patient's labs with him  His fasting blood glucose was 173  Hemoglobin A1c is now up to 7 8  Previously, he was 5 9  Patient has type 2 diabetes mellitus  Explained the diagnosis to him  We spoke at length about diet and exercise  I referred him to his certified diabetic educator for nutritional counseling and Diabetes Education  The patient was prescribed a glucometer as well as test strips and lancets  I asked him to check his blood sugars daily and record these  When he comes to his office visit, he should bring a record of his blood sugars with him  I discussed routine diabetic foot care with the patient  I also recommended getting a dilated eye exam on a yearly basis  He is going to make an appointment to see his optometrist   I recommended we start the patient on metformin  He will start 500 mg b i d  With food  We discussed potential side effects from the medication  I am going to order a BMP and hemoglobin A1c for a follow-up and see the patient back in 3 months, rather than 6 months  Dyslipidemia  I advised the patient that there are different standard for treating cholesterol for diabetics  His HDL cholesterol is only 28  LDL cholesterol was 97  His goal is less than 70  I started the patient on rosuvastatin 5 mg daily  Essential hypertension, benign  Blood pressure is top normal at 138/80  He is going to continue his current regiment  I note his potassium, BUN and creatinine were satisfactory      Seasonal allergic rhinitis due to pollen  The patient will continue Flonase and Zyrtec over-the-counter for his seasonal allergic rhinitis    Benign prostatic hyperplasia with nocturia  His symptoms have improved with the over-the-counter supplement he is taking  I note that his PSA was 2 6  Diagnoses and all orders for this visit:    Type 2 diabetes mellitus without complication, without long-term current use of insulin (HCC)  -     metFORMIN (GLUCOPHAGE) 500 mg tablet; Take 1 tablet (500 mg total) by mouth 2 (two) times a day with meals  -     Ambulatory referral to Diabetic Education; Future  -     Blood Glucose Monitoring Suppl (OneTouch Verio Reflect) w/Device KIT; Check blood sugars once daily  Please substitute with appropriate alternative as covered by patient's insurance  Dx: E11 65  -     glucose blood (OneTouch Verio) test strip; Check blood sugars once daily  Please substitute with appropriate alternative as covered by patient's insurance  Dx: E11 65  -     OneTouch Delica Lancets 50W MISC; Check blood sugars once daily  Please substitute with appropriate alternative as covered by patient's insurance  Dx: E11 65  -     Basic metabolic panel; Future  -     Hemoglobin A1C; Future    Dyslipidemia  -     rosuvastatin (CRESTOR) 5 mg tablet; Take 1 tablet (5 mg total) by mouth daily    Essential hypertension, benign    Seasonal allergic rhinitis due to pollen    Benign prostatic hyperplasia with nocturia          Subjective:      Patient ID: Clovis Brunner is a 64 y o  male  Patient is a 64year old white male who presents to the office today for his routine checkup  The patient reports poor eating habits  He did see his blood work on [address]  He is not exercising but he tells me he is very busy doing work in the house  He reports compliance with his medication  He is checking his blood pressures regularly at home        The following portions of the patient's history were reviewed and updated as appropriate: allergies, current medications, past family history, past medical history, past social history, past surgical history and problem list     Review of Systems   Constitutional: Negative for activity change, appetite change and unexpected weight change  Cardiovascular: Negative for chest pain, palpitations and leg swelling  Gastrointestinal: Negative for abdominal distention, abdominal pain, blood in stool, constipation, diarrhea and nausea  Genitourinary:        Reports nocturia, dribbling, and weak urinary stream which resolved after starting an over-the-counter prostate supplement   Neurological: Positive for numbness (Reports chronic paresthesias in his feet which he always attributed to his back problems)  Objective:      /80   Pulse 83   Temp (!) 95 9 °F (35 5 °C) (Tympanic)   Ht 6' 3" (1 905 m)   Wt 134 kg (295 lb 4 8 oz)   SpO2 100%   BMI 36 91 kg/m²          Physical Exam  Vitals reviewed  Constitutional:       Comments: Patient is a 55-year-old white male who appears his stated age  He is pleasant, cooperative, and in no distress  HENT:      Head: Normocephalic and atraumatic  Right Ear: Tympanic membrane, ear canal and external ear normal       Left Ear: Tympanic membrane, ear canal and external ear normal       Mouth/Throat:      Mouth: Mucous membranes are moist       Pharynx: Oropharynx is clear  No oropharyngeal exudate or posterior oropharyngeal erythema  Eyes:      General: No scleral icterus  Right eye: No discharge  Left eye: No discharge  Conjunctiva/sclera: Conjunctivae normal       Pupils: Pupils are equal, round, and reactive to light  Cardiovascular:      Rate and Rhythm: Normal rate and regular rhythm  Pulses: no weak pulses          Dorsalis pedis pulses are 2+ on the right side and 2+ on the left side  Posterior tibial pulses are 2+ on the right side and 2+ on the left side  Heart sounds: Normal heart sounds  No murmur heard  No friction rub  No gallop  Pulmonary:      Effort: Pulmonary effort is normal  No respiratory distress  Breath sounds: Normal breath sounds  No stridor  No wheezing, rhonchi or rales     Abdominal:      General: Bowel sounds are normal  There is no distension  Palpations: Abdomen is soft  There is no mass  Tenderness: There is no abdominal tenderness  There is no guarding  Musculoskeletal:      Cervical back: Neck supple  Right lower leg: No edema  Left lower leg: No edema  Feet:      Right foot:      Skin integrity: No ulcer, skin breakdown, erythema, warmth, callus or dry skin  Left foot:      Skin integrity: No ulcer, skin breakdown, erythema, warmth, callus or dry skin  Lymphadenopathy:      Cervical: No cervical adenopathy  Psychiatric:         Mood and Affect: Mood normal          Behavior: Behavior normal          Thought Content: Thought content normal          Judgment: Judgment normal          Patient's shoes and socks removed  Right Foot/Ankle   Right Foot Inspection  Skin Exam: skin normal and skin intact  No dry skin, no warmth, no callus, no erythema, no maceration, no abnormal color, no pre-ulcer, no ulcer and no callus  Toe Exam: No swelling, no tenderness, erythema and  no right toe deformity    Sensory   Vibration: diminished  Proprioception: intact  Monofilament testing: diminished    Vascular  Capillary refills: < 3 seconds  The right DP pulse is 2+  The right PT pulse is 2+  Left Foot/Ankle  Left Foot Inspection  Skin Exam: skin normal and skin intact  No dry skin, no warmth, no erythema, no maceration, normal color, no pre-ulcer, no ulcer and no callus  Toe Exam: No swelling, no tenderness, no erythema and no left toe deformity  Sensory   Vibration: diminished  Proprioception: intact  Monofilament testing: diminished    Vascular  Capillary refills: < 3 seconds  The left DP pulse is 2+  The left PT pulse is 2+       Assign Risk Category  No deformity present  Loss of protective sensation  No weak pulses  Risk: 1

## 2022-06-10 NOTE — ASSESSMENT & PLAN NOTE
Blood pressure is top normal at 138/80  He is going to continue his current regiment  I note his potassium, BUN and creatinine were satisfactory

## 2022-06-10 NOTE — ASSESSMENT & PLAN NOTE
I advised the patient that there are different standard for treating cholesterol for diabetics  His HDL cholesterol is only 28  LDL cholesterol was 97  His goal is less than 70  I started the patient on rosuvastatin 5 mg daily

## 2022-06-10 NOTE — ASSESSMENT & PLAN NOTE
Lab Results   Component Value Date    HGBA1C 7 8 (H) 05/26/2022   Patient has type 2 diabetes mellitus  I reviewed the patient's labs with him  His fasting blood glucose was 173  Hemoglobin A1c is now up to 7 8  Previously, he was 5 9  Patient has type 2 diabetes mellitus  Explained the diagnosis to him  We spoke at length about diet and exercise  I referred him to his certified diabetic educator for nutritional counseling and Diabetes Education  The patient was prescribed a glucometer as well as test strips and lancets  I asked him to check his blood sugars daily and record these  When he comes to his office visit, he should bring a record of his blood sugars with him  I discussed routine diabetic foot care with the patient  I also recommended getting a dilated eye exam on a yearly basis  He is going to make an appointment to see his optometrist   I recommended we start the patient on metformin  He will start 500 mg b i d  With food  We discussed potential side effects from the medication  I am going to order a BMP and hemoglobin A1c for a follow-up and see the patient back in 3 months, rather than 6 months

## 2022-07-08 ENCOUNTER — OFFICE VISIT (OUTPATIENT)
Dept: ENDOCRINOLOGY | Facility: OTHER | Age: 62
End: 2022-07-08
Payer: COMMERCIAL

## 2022-07-08 VITALS — WEIGHT: 286 LBS | BODY MASS INDEX: 35.56 KG/M2 | HEIGHT: 75 IN

## 2022-07-08 DIAGNOSIS — E11.9 TYPE 2 DIABETES MELLITUS WITHOUT COMPLICATION, WITHOUT LONG-TERM CURRENT USE OF INSULIN (HCC): ICD-10-CM

## 2022-07-08 PROCEDURE — 97802 MEDICAL NUTRITION INDIV IN: CPT | Performed by: DIETITIAN, REGISTERED

## 2022-07-08 NOTE — PATIENT INSTRUCTIONS
Follow meal plan, keep to carb recommendations, don't avoid carbs completely  Add more vegetables  Start to exercise, even 15 minutes a day is GOOD!

## 2022-07-08 NOTE — PROGRESS NOTES
Medical Nutrition Therapy        Assessment    Visit Type: Initial visit    Chief complaint I'm feeling better since cutting out the sugar    HPI: Deidre diet history reveals recent changes to decrease carbohydrate intake  He includes high fat proteins, fruits and vegetables, and limited whole grains  Currently meals range from 25 to 90 grams of carbohydrate  Explained basic pathophysiology of diabetes and impact of diet on blood glucose levels  Together we discussed what foods contain CHO, reading a food label, and serving sizes  Used the portion booklet to teach Shabana Green more about food groups and basic carbohydrate counting  Created an individualized meal plan for Shabana Green with 3 meals and 2-3 snacks providing 30-45 g carb per meal and 20-30 g carb per snack  This plan will help promote weight loss  Put together sample meals for Gustavo's reference  Shabana Green demonstrated good understanding, Shabana Green will call with questions or for more education  Ht Readings from Last 1 Encounters:   07/08/22 6' 3" (1 905 m)     Wt Readings from Last 2 Encounters:   07/08/22 130 kg (286 lb)   06/10/22 134 kg (295 lb 4 8 oz)     Weight Change: Yes -9# since  Dinorah 10 visit with PCP    Medical Diagnosis/ICD10: E11 9    Barriers to Learning: no barriers    Do you follow any special diet presently?: Yes - cut out all the sugar and a lot of other carbohydrates  Who shops: girlfriend  Who cooks: patient and girlfriend     Works four 10 hour days    Up at 4:30 am, starts work at 5 am, works until 4:30 pm    Food Log: Completed via the method of food recall (no milk)  Decaf first thing, then regular, w/ stevia only, adds Benefiber to coffee daily  Breakfast:8:00, hard boiled egg on multigrain bread (1), cup of yogurt (light Greek)  Morning Snack:none unless has yogurt later  Used to have a banana daily   Likes berries and peaches  Lunch:11:30-12, 1 cup pork and beans w/ 2 hot dogs w/ cheese (no roll)   Afternoon Snack: 2 pm, meat stick (aware of high sodium), was off yesterday, if working has no afternoon snack  Dinner: 4-4:30, 2 cups rice w/ vegetables, smoked pork OR salad w/ meat and cheese added  Evening Snack:sugar free popsicle, then last night had 2 string cheese and a couple of rice cakes  Beverages: coffee, flavored sugar free drinks w/ water OR flavored seltzer  Eating out/Take out:once recently - 1/4 of cheese steak and a handful of fries, green salad  Exercise just yard work, has a hard time with it on work days  Has a bike and treadmill in the basement    Calorie needs 1700kcals/day Carbs: 30-45g/meal, 20-30g/snack         Nutrition Diagnosis:  Food and nutrition related knowledge deficit  related to Lack of prior exposure to accurate nutrition related information as evidenced by New medical diagnosis or change in existing diagnosis or condition    Intervention: reduced fat intake, carbohydrate counting, increased plant based foods, meal timing, meal planning, individualized meal plan, monitoring portion control, exercise guidelines and food diary     Treatment Goals: Patient understands education and recommendations, Patient will increase their intake of plant based foods, Patient will count carbohydrates and Patient will exercise    Monitoring and evaluation:    Term code indicator  FH 1 6 3 Carbohydrate Intake Criteria: Follow meal plan  Term code indicator  FH 1 3 2 Food Intake Criteria: Choose more non-starchy vegetables and fewer fatty meats  Term code indicator  CH 2 2 Treatments/Therapy/Alternative Medicine Criteria: Start walking regularly    Patients Response to Instruction:  Comprehensiongood  Motivationgood  Expected Compliancegood    Thank you for coming to the [place] [address] for education today  Please feel free to call with any questions or concerns      [address] [facility]  [address] [place] [phone]

## 2022-08-11 ENCOUNTER — OFFICE VISIT (OUTPATIENT)
Dept: FAMILY MEDICINE CLINIC | Facility: CLINIC | Age: 62
End: 2022-08-11
Payer: COMMERCIAL

## 2022-08-11 VITALS
TEMPERATURE: 98.5 F | HEIGHT: 75 IN | BODY MASS INDEX: 34.42 KG/M2 | HEART RATE: 89 BPM | DIASTOLIC BLOOD PRESSURE: 60 MMHG | WEIGHT: 276.8 LBS | SYSTOLIC BLOOD PRESSURE: 128 MMHG | OXYGEN SATURATION: 98 %

## 2022-08-11 DIAGNOSIS — M75.51 ACUTE BURSITIS OF RIGHT SHOULDER: Primary | ICD-10-CM

## 2022-08-11 PROCEDURE — 3078F DIAST BP <80 MM HG: CPT | Performed by: FAMILY MEDICINE

## 2022-08-11 PROCEDURE — 99213 OFFICE O/P EST LOW 20 MIN: CPT | Performed by: FAMILY MEDICINE

## 2022-08-11 PROCEDURE — 3074F SYST BP LT 130 MM HG: CPT | Performed by: FAMILY MEDICINE

## 2022-08-11 PROCEDURE — 20610 DRAIN/INJ JOINT/BURSA W/O US: CPT | Performed by: FAMILY MEDICINE

## 2022-08-11 PROCEDURE — 3725F SCREEN DEPRESSION PERFORMED: CPT | Performed by: FAMILY MEDICINE

## 2022-08-11 RX ORDER — MELOXICAM 15 MG/1
15 TABLET ORAL DAILY
Qty: 20 TABLET | Refills: 0 | Status: SHIPPED | OUTPATIENT
Start: 2022-08-11 | End: 2022-09-01 | Stop reason: ALTCHOICE

## 2022-08-11 RX ORDER — LIDOCAINE HYDROCHLORIDE 20 MG/ML
1 INJECTION, SOLUTION EPIDURAL; INFILTRATION; INTRACAUDAL; PERINEURAL
Status: COMPLETED | OUTPATIENT
Start: 2022-08-11 | End: 2022-08-11

## 2022-08-11 RX ORDER — METHYLPREDNISOLONE ACETATE 80 MG/ML
0.5 INJECTION, SUSPENSION INTRA-ARTICULAR; INTRALESIONAL; INTRAMUSCULAR; SOFT TISSUE
Status: COMPLETED | OUTPATIENT
Start: 2022-08-11 | End: 2022-08-11

## 2022-08-11 RX ADMIN — METHYLPREDNISOLONE ACETATE 0.5 ML: 80 INJECTION, SUSPENSION INTRA-ARTICULAR; INTRALESIONAL; INTRAMUSCULAR; SOFT TISSUE at 18:32

## 2022-08-11 RX ADMIN — LIDOCAINE HYDROCHLORIDE 1 ML: 20 INJECTION, SOLUTION EPIDURAL; INFILTRATION; INTRACAUDAL; PERINEURAL at 18:32

## 2022-08-11 NOTE — ASSESSMENT & PLAN NOTE
Patient has bursitis and tendinitis of the right shoulder  We discussed treatment options  Patient elected to undergo cortisone injection  I prepped the right shoulder in the usual fashion  I injected the right subacromial bursa with 40 mg of Depo-Medrol and 1 mL of lidocaine 2%  The patient tolerated this well  He was also started on a short course of NSAIDs  He was started on Mobic 15 mg  He will take 1 daily with food  I gave the patient range of motion exercises to do and also instructed the patient to ice the shoulder twice a day for 10-15 minutes at a time  The patient has an upcoming appointment to see me in September for his diabetes  I will re-evaluate his shoulder at that time  However, I did tell the patient if his shoulder should worsen he should call me

## 2022-08-11 NOTE — PROGRESS NOTES
Assessment/Plan:    Acute bursitis of right shoulder  Patient has bursitis and tendinitis of the right shoulder  We discussed treatment options  Patient elected to undergo cortisone injection  I prepped the right shoulder in the usual fashion  I injected the right subacromial bursa with 40 mg of Depo-Medrol and 1 mL of lidocaine 2%  The patient tolerated this well  He was also started on a short course of NSAIDs  He was started on Mobic 15 mg  He will take 1 daily with food  I gave the patient range of motion exercises to do and also instructed the patient to ice the shoulder twice a day for 10-15 minutes at a time  The patient has an upcoming appointment to see me in September for his diabetes  I will re-evaluate his shoulder at that time  However, I did tell the patient if his shoulder should worsen he should call me  Large joint arthrocentesis: R subacromial bursa  Spurgeon Protocol:  Procedure performed by:  Consent: Verbal consent obtained  Written consent not obtained  Consent given by: patient    Supporting Documentation  Indications: pain   Procedure Details  Location: shoulder - R subacromial bursa  Needle size: 25 G  Approach: lateral  Medications administered: 0 5 mL methylPREDNISolone acetate 80 mg/mL; 1 mL lidocaine (PF) 2 %    Patient tolerance: patient tolerated the procedure well with no immediate complications  Dressing:  Sterile dressing applied           Diagnoses and all orders for this visit:    Acute bursitis of right shoulder  -     meloxicam (Mobic) 15 mg tablet; Take 1 tablet (15 mg total) by mouth daily    Other orders  -     Large joint arthrocentesis          Subjective:      Patient ID: Cody Nicholson is a 64 y o  male  This is a 35-year-old white male presents to the office today complaining of bilateral shoulder pain, although it is primarily his right shoulder    He tells me in May, he started taking his deck apart and also taking the roof off the top of the deck in order to replace them  Initially, he started having stiffness in the right shoulder afterwards  After he began construction to put a new roof in new deck onto his house, he started getting pain in his right shoulder  He tells me if 1st, he would lose lay off working on the deck for a short period of time and then his shoulder would feel better  He would then start working again and it would flare back up  He tells me his pain is gotten to the point now where he cannot even  his arm  He has to use his other arm to  the right arm  He finds it very difficult to find a comfortable position in order to sleep  He can not sleep on his right side  Pain seems to be worse in the morning upon awakening  He has tried some Tylenol Arthritis for his symptoms  The following portions of the patient's history were reviewed and updated as appropriate: allergies, current medications, past family history, past medical history, past social history, past surgical history and problem list     Review of Systems   Musculoskeletal: Positive for arthralgias  Negative for neck pain and neck stiffness  Neurological: Positive for weakness  Negative for numbness  Objective:      /60   Pulse 89   Temp 98 5 °F (36 9 °C) (Tympanic)   Ht 6' 3" (1 905 m)   Wt 126 kg (276 lb 12 8 oz)   SpO2 98%   BMI 34 60 kg/m²          Physical Exam  Vitals reviewed  Constitutional:       Comments: This is a 49-year-old white male who appears his stated age  He is in no apparent distress   Musculoskeletal:      Comments: There is decreased active range of motion of the right shoulder in all planes of movement although passive range of motion was full  The empty can sign is positive  Fuller impingement sign was negative  Neer impingement sign was negative  Drop-arm sign was negative  Sulcus test was negative    There is no tenderness to palpation over the bicipital groove   Neurological:      Comments: Deep tendon reflexes are + 2/4 in the upper extremities    Cervical compression test was negative

## 2022-08-23 DIAGNOSIS — J30.1 SEASONAL ALLERGIC RHINITIS DUE TO POLLEN: ICD-10-CM

## 2022-08-23 RX ORDER — FLUTICASONE PROPIONATE 50 MCG
SPRAY, SUSPENSION (ML) NASAL
Qty: 48 G | Refills: 3 | Status: SHIPPED | OUTPATIENT
Start: 2022-08-23

## 2022-08-30 ENCOUNTER — RA CDI HCC (OUTPATIENT)
Dept: OTHER | Facility: HOSPITAL | Age: 62
End: 2022-08-30

## 2022-08-30 NOTE — PROGRESS NOTES
[facility]  coding opportunities       Chart reviewed, no opportunity found: CHART REVIEWED, NO OPPORTUNITY FOUND        Patients Insurance        Commercial Insurance: [employer]

## 2022-09-01 ENCOUNTER — OFFICE VISIT (OUTPATIENT)
Dept: FAMILY MEDICINE CLINIC | Facility: CLINIC | Age: 62
End: 2022-09-01
Payer: COMMERCIAL

## 2022-09-01 VITALS
HEIGHT: 75 IN | OXYGEN SATURATION: 96 % | BODY MASS INDEX: 34.25 KG/M2 | HEART RATE: 61 BPM | WEIGHT: 275.5 LBS | SYSTOLIC BLOOD PRESSURE: 138 MMHG | DIASTOLIC BLOOD PRESSURE: 80 MMHG | TEMPERATURE: 96.1 F

## 2022-09-01 DIAGNOSIS — M75.101 ROTATOR CUFF SYNDROME OF RIGHT SHOULDER: Primary | ICD-10-CM

## 2022-09-01 DIAGNOSIS — M75.52 ACUTE BURSITIS OF LEFT SHOULDER: ICD-10-CM

## 2022-09-01 DIAGNOSIS — E11.9 TYPE 2 DIABETES MELLITUS WITHOUT COMPLICATION, WITHOUT LONG-TERM CURRENT USE OF INSULIN (HCC): ICD-10-CM

## 2022-09-01 PROCEDURE — 99214 OFFICE O/P EST MOD 30 MIN: CPT | Performed by: FAMILY MEDICINE

## 2022-09-01 PROCEDURE — 3079F DIAST BP 80-89 MM HG: CPT | Performed by: FAMILY MEDICINE

## 2022-09-01 PROCEDURE — 3075F SYST BP GE 130 - 139MM HG: CPT | Performed by: FAMILY MEDICINE

## 2022-09-01 RX ORDER — CELECOXIB 100 MG/1
100 CAPSULE ORAL 2 TIMES DAILY
Qty: 40 CAPSULE | Refills: 0 | Status: SHIPPED | OUTPATIENT
Start: 2022-09-01

## 2022-09-01 RX ORDER — CELECOXIB 100 MG/1
100 CAPSULE ORAL 2 TIMES DAILY
Qty: 40 CAPSULE | Refills: 0 | Status: SHIPPED | OUTPATIENT
Start: 2022-09-01 | End: 2022-09-01 | Stop reason: SDUPTHER

## 2022-09-01 NOTE — PROGRESS NOTES
Assessment/Plan:    Rotator cuff syndrome of right shoulder  I am concerned about the possibility of a complete rotator cuff tear on the right  I ordered an MRI of the right shoulder  I started the patient on physical therapy  I am also going to recommend consultation with Orthopedics  I placed a referral for the patient to see Dr Nirmala Patterson  I advised the patient to continue to ice the shoulder and rested  He may continue range-of-motion exercises until he sees Orthopedics  Acute bursitis of left shoulder  Patient did not benefit from the Mobic  Because of his GI problems, I started the patient on celecoxib 100 mg b i d  He has been advised to take this with food  He was given 40 capsules with no refills  X-rays of the left shoulder were ordered  Diagnoses and all orders for this visit:    Rotator cuff syndrome of right shoulder  -     MRI shoulder right wo contrast; Future  -     Ambulatory Referral to Physical Therapy; Future  -     Ambulatory Referral to Orthopedic Surgery; Future  -     Discontinue: celecoxib (CeleBREX) 100 mg capsule; Take 1 capsule (100 mg total) by mouth 2 (two) times a day  -     celecoxib (CeleBREX) 100 mg capsule; Take 1 capsule (100 mg total) by mouth 2 (two) times a day    Type 2 diabetes mellitus without complication, without long-term current use of insulin (HCC)    Acute bursitis of left shoulder  -     Ambulatory Referral to Physical Therapy; Future  -     Ambulatory Referral to Orthopedic Surgery; Future  -     XR shoulder 2+ vw left; Future          Subjective:      Patient ID: Susan Do is a 58 y o  male  This is a 80-year-old white male who presents to the office today complaining that his shoulders are not getting any better  He is having bilateral shoulder pain although his right shoulder is much worse than his left  He still having trouble lifting it  He often has to use his left arm to  his right arm    He tells me the shot worked at [employer] but it wore off quickly  He tells me that he does not think that the shoulder is as weak as it was  However, when he tries to demonstrate, he is not moving his shoulder, only his forearm in relation to the elbow  He tells me he is doing range-of-motion exercises  He ran out of the Proteopure yesterday  It did not make much of a difference  He tells me he is able to move his left shoulder around but it is painful  He finds it difficult to get into a comfortable position at night  He has not been doing any more work around his house  For his place of employment, he works at home and uses a computer  The following portions of the patient's history were reviewed and updated as appropriate: allergies, current medications, past family history, past medical history, past social history, past surgical history and problem list     Review of Systems   Gastrointestinal: Negative for abdominal distention, abdominal pain, constipation and diarrhea  Reports occasional heartburn   Musculoskeletal: Negative for neck pain and neck stiffness  Neurological: Positive for weakness and numbness  Objective:      /80   Pulse 61   Temp (!) 96 1 °F (35 6 °C) (Tympanic)   Ht 6' 3" (1 905 m)   Wt 125 kg (275 lb 8 oz)   SpO2 96%   BMI 34 44 kg/m²          Physical Exam  Vitals reviewed  Constitutional:       Comments: This is a 72-year-old white male who appears his stated age  He is pleasant, cooperative, and in no distress   Cardiovascular:      Rate and Rhythm: Normal rate and regular rhythm  Heart sounds: Normal heart sounds  No murmur heard  No friction rub  No gallop  Pulmonary:      Effort: Pulmonary effort is normal  No respiratory distress  Breath sounds: Normal breath sounds  No stridor  No wheezing, rhonchi or rales  Musculoskeletal:      Comments: The empty can sign is positive on the right  There is decreased active range of motion in the shoulders bilaterally    The right shoulder was significantly decreased although passive range of motion is full  There was no tenderness over the bicipital groove bilaterally  Neer impingement sign was negative bilaterally  Fuller impingement sign was mildly positive on the right  Apprehension test is negative  Sulcus test is negative  Positive painful arc bilaterally  Drop-arm sign is negative bilaterally  Neurological:      Comments: He has no paraspinal cervical muscle spasms or tenderness  Deep tendon reflexes are + 2/4 in the upper extremities  Cervical compression test is negative

## 2022-09-02 NOTE — ASSESSMENT & PLAN NOTE
Patient did not benefit from the Mobic  Because of his GI problems, I started the patient on celecoxib 100 mg b i d  He has been advised to take this with food  He was given 40 capsules with no refills  X-rays of the left shoulder were ordered

## 2022-09-02 NOTE — ASSESSMENT & PLAN NOTE
I am concerned about the possibility of a complete rotator cuff tear on the right  I ordered an MRI of the right shoulder  I started the patient on physical therapy  I am also going to recommend consultation with Orthopedics  I placed a referral for the patient to see Dr Zeynep Ray  I advised the patient to continue to ice the shoulder and rested  He may continue range-of-motion exercises until he sees Orthopedics

## 2022-09-07 ENCOUNTER — EVALUATION (OUTPATIENT)
Dept: PHYSICAL THERAPY | Facility: CLINIC | Age: 62
End: 2022-09-07
Payer: COMMERCIAL

## 2022-09-07 DIAGNOSIS — M75.101 ROTATOR CUFF SYNDROME OF RIGHT SHOULDER: ICD-10-CM

## 2022-09-07 DIAGNOSIS — M75.52 ACUTE BURSITIS OF LEFT SHOULDER: ICD-10-CM

## 2022-09-07 PROCEDURE — 97161 PT EVAL LOW COMPLEX 20 MIN: CPT | Performed by: PHYSICAL THERAPIST

## 2022-09-07 PROCEDURE — 97110 THERAPEUTIC EXERCISES: CPT | Performed by: PHYSICAL THERAPIST

## 2022-09-07 PROCEDURE — 97140 MANUAL THERAPY 1/> REGIONS: CPT | Performed by: PHYSICAL THERAPIST

## 2022-09-07 NOTE — PROGRESS NOTES
PT Evaluation     Today's date: 2022  Patient name: Jack Valle  : 1960  MRN: [id number]  Referring provider: Jacqueline Leung DO  Dx: No diagnosis found  Assessment  Assessment details: Pt presents to PT with a few months of b/l shoulder pain, however right is worse than left  He has significant limitation with active mobility that limits testing  ROM greater passive than active  SS indicate RTC driven pain at this time  Initial focus will be on gaining functional ROM and transitioning to strength as appropriate  He does have decent strength ER  MRI set-up for tomorrow and ortho appt Monday  Skilled PT warranted to address findings and progress towards outlined goals  Pt in agreement with current POC      Impairments: abnormal or restricted ROM, activity intolerance, impaired physical strength and pain with function  Functional limitations: shoulder motion above chest level, lifting, sleeping  Symptom irritability: moderateUnderstanding of Dx/Px/POC: good   Prognosis: fair    Goals  ST-4 weeks  Active motion to 140 deg R shoulder  Reduce pain with daily activity and sleeping by 50%  Independent with HEP focused on mobility    LT-8 weeks  Able to resist flex and abd for 4/5 or greater by DC  Independent with RTC and scapular strength  Reduce pain by 75% with typical daily activity  Minimal to no pain donning/doffing shirt    Plan  Plan details: TE, NMR, TA, MT, self education, and modalities as needed in order to progress through skilled PT focused on  ROM, strength, balance, coordination   Patient would benefit from: skilled physical therapy  Planned modality interventions: cryotherapy and thermotherapy: hydrocollator packs  Planned therapy interventions: manual therapy, neuromuscular re-education, self care, therapeutic activities, therapeutic exercise and home exercise program  Frequency: 2x week  Duration in weeks: 6  Treatment plan discussed with: patient        Subjective Evaluation    History of Present Illness  Mechanism of injury: 58year old male presenting to PT with b/l shoulder pain  R worse than L  Was doing some home remodeling and using arms above shoulder for a while and started having pain  Would get better with rest but happens each time he tries to use shoulders and pain is now worse  Limited use of the right arm due to pain, left can use but also sore  He is RHD  Denied any significant shoulder and neck issues  Did have lumbar surgery years ago  Pain  At best pain ratin  At worst pain ratin    Patient Goals  Patient goals for therapy: decreased pain, increased motion, increased strength, independence with ADLs/IADLs and return to sport/leisure activities          Objective     General Comments:      Shoulder Comments   Cervical screen  [facility]    Shoulder AROM R/L  Flex 70/150  Abd 60/150  ER 70/60  BB reach sacral base/L1        Shoulder PROM R/L  Flex 125/150  Abd 100/150  Er 75/75  Ir 45/45      MMT R/L  Flex/abd: NT  ER: 4-/4  IR: 5/5      Palpation  Slight tenderness over supra tendon  Post RTC tender      Special tests - er lag (-)                     Precautions: DM, HTN     Daily Treatment Diary: 20 visit max!!!!      Initial Evaluation Date: 22  Compliance                      Visit Number 1                    Re-Eval  IE                 [address] [facility] Captured                                Manuals         Joint work R shoulder distraction, PROM all planes        ST         flexibility                  Neuro Re-Ed         tband ext         tband rows                  Rhythmic stability                                    Ther Ex         UBE/pulleys         Stick flex x10        Stick ER         Table slide x10        scap retraction x10                 Supine scap retraction 2-way         Prone rows         Prone ext         Prone horiz abd                  bicep         tricep         Ther Activity                           Modalities Access Code: [email or web address]  URL: [email or web address]  Date: 09/07/2022  Prepared by: Berdine Saunas    Exercises  · Seated Shoulder Flexion Towel Slide at Table Top Full Range of Motion - 1 x daily - 7 x weekly - 3 sets - 10 reps  · Supine Shoulder Flexion Extension AAROM with Dowel - 1 x daily - 7 x weekly - 3 sets - 10 reps  · Seated Scapular Retraction - 1 x daily - 7 x weekly - 3 sets - 10 reps

## 2022-09-13 ENCOUNTER — OFFICE VISIT (OUTPATIENT)
Dept: PHYSICAL THERAPY | Facility: CLINIC | Age: 62
End: 2022-09-13
Payer: COMMERCIAL

## 2022-09-13 DIAGNOSIS — M75.101 ROTATOR CUFF SYNDROME OF RIGHT SHOULDER: ICD-10-CM

## 2022-09-13 DIAGNOSIS — M75.52 ACUTE BURSITIS OF LEFT SHOULDER: Primary | ICD-10-CM

## 2022-09-13 PROCEDURE — 97140 MANUAL THERAPY 1/> REGIONS: CPT

## 2022-09-13 PROCEDURE — 97110 THERAPEUTIC EXERCISES: CPT

## 2022-09-13 NOTE — PROGRESS NOTES
Daily Note     Today's date: 2022  Patient name: Cely Srivastava  : 1960  MRN: [id number]  Referring provider: Madai Blank DO  Dx:   Encounter Diagnosis     ICD-10-CM    1  Acute bursitis of left shoulder  M75 52    2  Rotator cuff syndrome of right shoulder  M75  101                   Subjective: Patient reports shoulder soreness continues  Objective: See treatment diary below      Assessment: Galen Moore tolerated treatment well  Abduction limited most bilaterally  Continued PT should improve shoulder function  Plan: Continue per plan of care  Precautions: DM, HTN     Daily Treatment Diary: 20 visit max per calendar year!!!! Initial Evaluation Date: 22  Compliance                    Visit Number 1 2                   Re-Eval  IE                 [facility]   [employer] Captured                                Manuals        Joint work R shoulder distraction, PROM all planes R shoulder distraction, PROM all planes       ST         flexibility                  Neuro Re-Ed         tband ext         tband rows                  Rhythmic stability                                    Ther Ex         UBE/pulleys  10'       Stick flex x10 x10       Stick ER  x10       Table slide x10 10"x10 ea       scap retraction x10 10"x10       olivier levator stretch  nv       olivier upper trap stretch  10"x10       Supine scap retraction 2-way         Prone rows         Prone ext         Prone horiz abd                  bicep         tricep         Ther Activity                           Modalities                             Access Code: [email or web address]  URL: [email or web address]  Date: 2022  Prepared by: Saritha Jovel    Exercises  · Seated Shoulder Flexion Towel Slide at Table Top Full Range of Motion - 1 x daily - 7 x weekly - 3 sets - 10 reps  · Supine Shoulder Flexion Extension AAROM with Dowel - 1 x daily - 7 x weekly - 3 sets - 10 reps  · Seated Scapular Retraction - 1 x daily - 7 x weekly - 3 sets - 10 reps

## 2022-09-15 ENCOUNTER — OFFICE VISIT (OUTPATIENT)
Dept: PHYSICAL THERAPY | Facility: CLINIC | Age: 62
End: 2022-09-15
Payer: COMMERCIAL

## 2022-09-15 DIAGNOSIS — M75.101 ROTATOR CUFF SYNDROME OF RIGHT SHOULDER: ICD-10-CM

## 2022-09-15 DIAGNOSIS — M75.52 ACUTE BURSITIS OF LEFT SHOULDER: Primary | ICD-10-CM

## 2022-09-15 PROCEDURE — 97110 THERAPEUTIC EXERCISES: CPT | Performed by: PHYSICAL THERAPIST

## 2022-09-15 PROCEDURE — 97140 MANUAL THERAPY 1/> REGIONS: CPT | Performed by: PHYSICAL THERAPIST

## 2022-09-15 NOTE — PROGRESS NOTES
Daily Note     Today's date: 9/15/2022  Patient name: Shannan Horton  : 1960  MRN: [id number]  Referring provider: Kaitlin Arauz DO  Dx:   Encounter Diagnosis     ICD-10-CM    1  Acute bursitis of left shoulder  M75 52    2  Rotator cuff syndrome of right shoulder  M75  101                   Subjective: shoulders were sore yesterday but more of a muscle sore from using it  Today they feel a little better  Feels he is doing a few more things during day with reaching out a little instead of keeping elbow at side  Objective: See treatment diary below      Assessment: ROM remains limited active/passive but able to incorporate more scapular work with minimal symptoms  trialed low level mobs but no immediate change  Further skilled PT warranted ot address ROM and strength  Plan: continue with current POC     Precautions: DM, HTN     Daily Treatment Diary: 20 visit max per calendar year!!!!      Initial Evaluation Date: 22  Compliance 9/7  9/13  9/15                 Visit Number 1 2  3                 Re-Eval  IE                 [address] Captured                                Manuals 9/7 9/13 9/15      Joint work R shoulder distraction, PROM all planes R shoulder distraction, PROM all planes R shld distraction, PROM all planes, inf mob gr 1-2      ST   subscap TpR      flexibility                  Neuro Re-Ed         tband ext                           Rhythmic stability                                    Ther Ex         UBE/pulleys  10' 5'      Stick flex x10 x10 x15      Stick ER  x10 Table 90/90 position work 10x10"      Table slide x10 10"x10 ea 15x10" ea      scap retraction x10 10"x10       olivier levator stretch  nv       olivier upper trap stretch  10"x10       Supine scap retraction 2-way   gtb 2x20 ea      isometric   Ir/er sub-max 10x 4" b/l      tband rows   x20 gtb      Prone rows         Prone ext         Prone horiz abd                  bicep         tricep         Ther Activity Modalities                             Access Code: [email or web address]  URL: [email or web address]  Date: 09/07/2022  Prepared by: Saritha Jovel    Exercises  · Seated Shoulder Flexion Towel Slide at Table Top Full Range of Motion - 1 x daily - 7 x weekly - 3 sets - 10 reps  · Supine Shoulder Flexion Extension AAROM with Dowel - 1 x daily - 7 x weekly - 3 sets - 10 reps  · Seated Scapular Retraction - 1 x daily - 7 x weekly - 3 sets - 10 reps

## 2022-09-16 ENCOUNTER — APPOINTMENT (OUTPATIENT)
Dept: LAB | Facility: HOSPITAL | Age: 62
End: 2022-09-16
Payer: COMMERCIAL

## 2022-09-16 DIAGNOSIS — E11.9 TYPE 2 DIABETES MELLITUS WITHOUT COMPLICATION, WITHOUT LONG-TERM CURRENT USE OF INSULIN (HCC): ICD-10-CM

## 2022-09-16 LAB
ANION GAP SERPL CALCULATED.3IONS-SCNC: 9 MMOL/L (ref 4–13)
BUN SERPL-MCNC: 14 MG/DL (ref 5–25)
CALCIUM SERPL-MCNC: 10 MG/DL (ref 8.3–10.1)
CHLORIDE SERPL-SCNC: 101 MMOL/L (ref 96–108)
CO2 SERPL-SCNC: 30 MMOL/L (ref 21–32)
CREAT SERPL-MCNC: 0.87 MG/DL (ref 0.6–1.3)
EST. AVERAGE GLUCOSE BLD GHB EST-MCNC: 123 MG/DL
GFR SERPL CREATININE-BSD FRML MDRD: 92 ML/MIN/1.73SQ M
GLUCOSE SERPL-MCNC: 123 MG/DL (ref 65–140)
HBA1C MFR BLD: 5.9 %
POTASSIUM SERPL-SCNC: 3.8 MMOL/L (ref 3.5–5.3)
SODIUM SERPL-SCNC: 140 MMOL/L (ref 135–147)

## 2022-09-16 PROCEDURE — 3044F HG A1C LEVEL LT 7.0%: CPT | Performed by: FAMILY MEDICINE

## 2022-09-16 PROCEDURE — 36415 COLL VENOUS BLD VENIPUNCTURE: CPT

## 2022-09-16 PROCEDURE — 83036 HEMOGLOBIN GLYCOSYLATED A1C: CPT

## 2022-09-16 PROCEDURE — 80048 BASIC METABOLIC PNL TOTAL CA: CPT

## 2022-09-20 ENCOUNTER — OFFICE VISIT (OUTPATIENT)
Dept: PHYSICAL THERAPY | Facility: CLINIC | Age: 62
End: 2022-09-20
Payer: COMMERCIAL

## 2022-09-20 DIAGNOSIS — M75.52 ACUTE BURSITIS OF LEFT SHOULDER: Primary | ICD-10-CM

## 2022-09-20 DIAGNOSIS — M75.101 ROTATOR CUFF SYNDROME OF RIGHT SHOULDER: ICD-10-CM

## 2022-09-20 PROCEDURE — 97110 THERAPEUTIC EXERCISES: CPT

## 2022-09-20 PROCEDURE — 97140 MANUAL THERAPY 1/> REGIONS: CPT

## 2022-09-20 NOTE — PROGRESS NOTES
Daily Note     Today's date: 2022  Patient name: Tone Hameed  : 1960  MRN: [id number]  Referring provider: Gena Adames DO  Dx:   Encounter Diagnosis     ICD-10-CM    1  Acute bursitis of left shoulder  M75 52    2  Rotator cuff syndrome of right shoulder  M75  101                   Subjective: Patient reports he was doing some things around the house this weekend and had some muscle soreness  Objective: See treatment diary below      Assessment: Tone Hameed continues with limited ROM but decreased pain in available range  Appears to get relief from mobilizations  Continued treatment to improve ROM and function indicated  Plan: Continue per plan of care  Precautions: DM, HTN     Daily Treatment Diary: 20 visit max per calendar year!!!!      Initial Evaluation Date: 22  Compliance 9/7  9/13  9/15  9/20               Visit Number 1 2  3  4               Re-Eval  IE                 [address] [place] [address] Captured                                Manuals 9/7 9/13 9/15 9/20     Joint work R shoulder distraction, PROM all planes R shoulder distraction, PROM all planes R shld distraction, PROM all planes, inf mob gr 1-2 R shld distraction, PROM all planes, inf mob gr 1-2     ST   subscap TpR subscap TpR     flexibility                  Neuro Re-Ed         tband ext                           Rhythmic stability                                    Ther Ex         UBE/pulleys  10' 5' 5'     Stick flex x10 x10 x15 x15     Stick ER  x10 Table 90/90 position work 10x10" Table 90/90 position work 10x10"+     Table slide x10 10"x10 ea 15x10" ea 15x10" ea     scap retraction x10 10"x10       olivier levator stretch  nv       olivier upper trap stretch  10"x10       Supine scap retraction 2-way   gtb 2x20 ea gtb 2x10 ea     isometric   Ir/er sub-max 10x 4" b/l Ir/er sub-max 10x 10" b/l     tband rows   x20 gtb x20 gtb     Prone rows         Prone ext         Prone horiz abd                  bicep         tricep Ther Activity                           Modalities                             Access Code: [email or web address]  URL: [email or web address]  Date: 09/07/2022  Prepared by: Je Hewitt    Exercises  · Seated Shoulder Flexion Towel Slide at Table Top Full Range of Motion - 1 x daily - 7 x weekly - 3 sets - 10 reps  · Supine Shoulder Flexion Extension AAROM with Dowel - 1 x daily - 7 x weekly - 3 sets - 10 reps  · Seated Scapular Retraction - 1 x daily - 7 x weekly - 3 sets - 10 reps

## 2022-09-22 ENCOUNTER — OFFICE VISIT (OUTPATIENT)
Dept: PHYSICAL THERAPY | Facility: CLINIC | Age: 62
End: 2022-09-22
Payer: COMMERCIAL

## 2022-09-22 DIAGNOSIS — M75.52 ACUTE BURSITIS OF LEFT SHOULDER: Primary | ICD-10-CM

## 2022-09-22 DIAGNOSIS — M75.101 ROTATOR CUFF SYNDROME OF RIGHT SHOULDER: ICD-10-CM

## 2022-09-22 PROCEDURE — 97140 MANUAL THERAPY 1/> REGIONS: CPT | Performed by: PHYSICAL THERAPIST

## 2022-09-22 PROCEDURE — 97110 THERAPEUTIC EXERCISES: CPT | Performed by: PHYSICAL THERAPIST

## 2022-09-22 NOTE — PROGRESS NOTES
Daily Note     Today's date: 2022  Patient name: Tone Hameed  : 1960  MRN: [id number]  Referring provider: Gena Adames DO  Dx:   Encounter Diagnosis     ICD-10-CM    1  Acute bursitis of left shoulder  M75 52    2  Rotator cuff syndrome of right shoulder  M75  101                   Subjective: able to reach at shoulder height for microwave with right arm now and lift out coffee cup  Sleeping is not too bad  Left shoulder is a little more sore last few days      Objective: See treatment diary below      Assessment: after first few motion focused activities, he had nearly full elevation R shoulder today with minimal pain  Added low level strength and tolerated most well  Encouraged continued HEP focused on ROM  Plan: continue ROM and add strength     Precautions: DM, HTN     Daily Treatment Diary: 20 visit max per calendar year!!!!      Initial Evaluation Date: 22  Compliance 9/7  9/13  9/15  9/20  9/22             Visit Number 1 2  3  4  5             Re-Eval  IE                 [address] [place] [address] Captured                                Manuals 9/7 9/13 9/15 9/20 9/22    Joint work R shoulder distraction, PROM all planes R shoulder distraction, PROM all planes R shld distraction, PROM all planes, inf mob gr 1-2 R shld distraction, PROM all planes, inf mob gr 1-2 r shoulder PROM    ST   subscap TpR subscap TpR subscap TpR    flexibility                  Neuro Re-Ed         tband ext                           Rhythmic stability                                    Ther Ex         UBE/pulleys  10' 5' 5' 5' UBE, 3'pulleys    Stick flex x10 x10 x15 x15 hep    Stick ER  x10 Table 90/90 position work 10x10" Table 90/90 position work 10x10"+ hep    Table slide x10 10"x10 ea 15x10" ea 15x10" ea hep    scap retraction x10 10"x10       olivier levator stretch  nv       olivier upper trap stretch  10"x10       Supine scap retraction 2-way   gtb 2x20 ea gtb 2x10 ea gtb x20 ea    isometric   Ir/er sub-max 10x 4" b/l Ir/er sub-max 10x 10" b/l Manual rhythmic stability ir/er and balance position supine x6'    tband rows   x20 gtb x20 gtb x30 gtb    S/l er     2x10, 2x10 2#    S/l abd     x20 modified range    Prone rows         Prone ext         Prone horiz abd         Supine serratus punch     2# d/c    bicep         tricep     bktb x30    Ther Activity                           Modalities                             Access Code: [email or web address]  URL: [email or web address]  Date: 09/07/2022  Prepared by: Anahy Finer    Exercises  · Seated Shoulder Flexion Towel Slide at Table Top Full Range of Motion - 1 x daily - 7 x weekly - 3 sets - 10 reps  · Supine Shoulder Flexion Extension AAROM with Dowel - 1 x daily - 7 x weekly - 3 sets - 10 reps  · Seated Scapular Retraction - 1 x daily - 7 x weekly - 3 sets - 10 reps

## 2022-09-23 ENCOUNTER — OFFICE VISIT (OUTPATIENT)
Dept: FAMILY MEDICINE CLINIC | Facility: CLINIC | Age: 62
End: 2022-09-23
Payer: COMMERCIAL

## 2022-09-23 VITALS
HEART RATE: 79 BPM | BODY MASS INDEX: 33.89 KG/M2 | OXYGEN SATURATION: 97 % | DIASTOLIC BLOOD PRESSURE: 82 MMHG | WEIGHT: 272.6 LBS | HEIGHT: 75 IN | TEMPERATURE: 97.1 F | SYSTOLIC BLOOD PRESSURE: 118 MMHG

## 2022-09-23 DIAGNOSIS — I10 ESSENTIAL HYPERTENSION, BENIGN: ICD-10-CM

## 2022-09-23 DIAGNOSIS — Z23 ENCOUNTER FOR IMMUNIZATION: ICD-10-CM

## 2022-09-23 DIAGNOSIS — E11.9 TYPE 2 DIABETES MELLITUS WITHOUT COMPLICATION, WITHOUT LONG-TERM CURRENT USE OF INSULIN (HCC): Primary | ICD-10-CM

## 2022-09-23 DIAGNOSIS — M75.101 ROTATOR CUFF SYNDROME OF RIGHT SHOULDER: ICD-10-CM

## 2022-09-23 DIAGNOSIS — E78.5 DYSLIPIDEMIA: ICD-10-CM

## 2022-09-23 PROCEDURE — 90471 IMMUNIZATION ADMIN: CPT

## 2022-09-23 PROCEDURE — 99214 OFFICE O/P EST MOD 30 MIN: CPT | Performed by: FAMILY MEDICINE

## 2022-09-23 PROCEDURE — 90682 RIV4 VACC RECOMBINANT DNA IM: CPT

## 2022-09-23 NOTE — PATIENT INSTRUCTIONS
Foot Care for People with Diabetes   AMBULATORY CARE:   What you need to know about foot care:   · Foot care helps protect your feet and prevent foot ulcers or sores  Long-term high blood sugar levels can damage the blood vessels and nerves in your legs and feet  This damage makes it hard to feel pressure, pain, temperature, and touch  You may not be able to feel a cut or sore, or shoes that are too tight  Foot care is needed to prevent serious problems, such as an infection or amputation  · Diabetes may cause your toes to become crooked or curved under  These changes may affect the way you walk and can lead to increased pressure on your foot  The pressure can decrease blood flow to your feet  Lack of blood flow increases your risk for a foot ulcer  Do not ignore small problems, such as dry skin or small wounds  These can become life-threatening over time without proper care  Call your care team provider if:   · Your feet become numb, weak, or hard to move  · You have pus draining from a sore on your foot  · You have a wound on your foot that gets bigger, deeper, or does not heal      · You see blisters, cuts, scratches, calluses, or sores on your foot  · You have a fever, and your feet become red, warm, and swollen  · Your toenails become thick, curled, or yellow  · You find it hard to check your feet because your vision is poor  · You have questions or concerns about your condition or care  How to care for your feet:   · Check your feet each day  Look at your whole foot, including the bottom, and between and under your toes  Check for wounds, corns, and calluses  Use a mirror to see the bottom of your feet  The skin on your feet may be shiny, tight, or darker than normal  Your feet may also be cold and pale  Feel your feet by running your hands along the tops, bottoms, sides, and between your toes   Redness, swelling, and warmth are signs of blood flow problems that can lead to a foot ulcer  Do not try to remove corns or calluses yourself  · Wash your feet each day with soap and warm water  Do not use hot water, because this can injure your foot  Dry your feet gently with a towel after you wash them  Dry between and under your toes  · Apply lotion or a moisturizer on your dry feet  Ask your care team provider what lotions are best to use  Do not put lotion or moisturizer between your toes  Moisture between your toes could lead to skin breakdown  · Cut your toenails correctly  File or cut your toenails straight across  Use a soft brush to clean around your toenails  If your toenails are very thick, you may need to have a care team provider or specialist cut them  · Protect your feet  Do not walk barefoot or wear your shoes without socks  Check your shoes for rocks or other objects that can hurt your feet  Wear cotton socks to help keep your feet dry  Wear socks without toe seams, or wear them with the seams inside out  Change your socks each day  Do not wear socks that are dirty or damp  · Wear shoes that fit well  Wear shoes that do not rub against any area of your feet  Your shoes should be ½ to ¾ inch (1 to 2 centimeters) longer than your feet  Your shoes should also have extra space around the widest part of your feet  Walking or athletic shoes with laces or straps that adjust are best  Ask your care team provider for help to choose shoes that fit you best  Ask him or her if you need to wear an insert, orthotic, or bandage on your feet  · Go to your follow-up visits  Your care team provider will do a foot exam at least once a year  You may need a foot exam more often if you have nerve damage, foot deformities, or ulcers  He will check for nerve damage and how well you can feel your feet  He will check your shoes to see if they fit well  · Do not smoke  Smoking can damage your blood vessels and put you at increased risk for foot ulcers   Ask your care team provider for information if you currently smoke and need help to quit  E-cigarettes or smokeless tobacco still contain nicotine  Talk to your care team provider before you use these products  Follow up with your diabetes care team provider or foot specialist as directed: You will need to have your feet checked at least once a year  You may need a foot exam more often if you have nerve damage, foot deformities, or ulcers  Write down your questions so you remember to ask them during your visits  © Copyright [employer] 2022 Information is for End User's use only and may not be sold, redistributed or otherwise used for commercial purposes  All illustrations and images included in CareNotes® are the copyrighted property of A D A M [employer] Inc  or [facility] Demond Singletary   The above information is an  only  It is not intended as medical advice for individual conditions or treatments  Talk to your doctor, nurse or pharmacist before following any medical regimen to see if it is safe and effective for you

## 2022-09-23 NOTE — PROGRESS NOTES
Name: Mounika Greco      : 1960      MRN: [id number]  Encounter Provider: Farida Hernandez DO  Encounter Date: 2022   [facility]  Type 2 diabetes mellitus without complication, without long-term current use of insulin (Formerly [facility])  Assessment & Plan:    Lab Results   Component Value Date    HGBA1C 5 9 (H) 2022   Patient has lowered his hemoglobin A1c from 7 8-5 9  He has done terrific  His fasting blood glucose was noted to be 123  I encouraged the patient to continue to try to lose weight, walk for exercise, watch his diet  For now, I will continue the patient on metformin  I will consider decreasing the dose  I told the patient if he continues to do well it is possible that we may be able to discontinue the medication at some point  I do recommend he see Ophthalmology for dilated eye exam   We also discussed routine diabetic foot care  I ordered reaching fasting blood work for his next office visit  Orders:  -     Hemoglobin A1C; Future; Expected date: 2022  -     Comprehensive metabolic panel; Future    2  Encounter for immunization  -     influenza vaccine, quadrivalent, recombinant, PF, 0 5 mL, for patients 18 yr+ (FLUBLOK)    3  Dyslipidemia  Assessment & Plan:  I ordered a fasting lipid panel for the patient's next office visit  His goal LDL cholesterol is less than 70  He should continue rosuvastatin 5 mg daily    Orders:  -     Lipid panel; Future    4  Essential hypertension, benign  Assessment & Plan:  Patient has hypertension  His blood pressure has also improved with the weight that he is lost   The patient will continue amlodipine 5 mg daily and lisinopril 40 mg daily  Please note, I did check the patient's blood pressure myself  I found his blood pressure to be 124/82  5  Rotator cuff syndrome of right shoulder  Assessment & Plan:  I reviewed the patient's physical therapy note    He is improving, though slowly  I told him I will do want him to see Orthopedics for their opinion  If the patient fails to improve or worsens, he will need to be referred back to see Orthopedics  I advised the patient to continue to rest his shoulder  Subjective      This is a 66-year-old white male presents to the office today for his routine checkup  He is doing well  He is watching his diet and has lost weight  He notes his blood sugars have been improving  He has been checking them regularly  He is trying to do some walking for exercise as well  He is attending physical therapy at this time for his shoulders  He tells me that the pain in his shoulders is improving  Unfortunately, he canceled his appointment to see Orthopedics  His insurance did not cover the MRI  That was subsequently canceled  Review of Systems   Respiratory: Negative for cough and shortness of breath  Cardiovascular: Negative for chest pain, palpitations and leg swelling  Gastrointestinal: Negative for abdominal distention, abdominal pain, blood in stool, constipation, diarrhea and nausea  Musculoskeletal: Positive for arthralgias (Positive for shoulder)  Negative for back pain  Neurological: Negative for numbness  Current Outpatient Medications on File Prior to Visit   Medication Sig    amLODIPine (NORVASC) 5 mg tablet TAKE 1 TABLET DAILY    Blood Glucose Monitoring Suppl (OneTouch Verio Reflect) w/Device KIT Check blood sugars once daily  Please substitute with appropriate alternative as covered by patient's insurance  Dx: E11 65    celecoxib (CeleBREX) 100 mg capsule Take 1 capsule (100 mg total) by mouth 2 (two) times a day    fluticasone (FLONASE) 50 mcg/act nasal spray USE 2 SPRAYS IN EACH NOSTRIL DAILY    glucose blood (OneTouch Verio) test strip Check blood sugars once daily  Please substitute with appropriate alternative as covered by patient's insurance   Dx: E11 65    hydrochlorothiazide (HYDRODIURIL) 25 mg tablet Take 1 tablet (25 mg total) by mouth daily    lisinopril (ZESTRIL) 40 mg tablet TAKE 1 TABLET DAILY    metFORMIN (GLUCOPHAGE) 500 mg tablet Take 1 tablet (500 mg total) by mouth 2 (two) times a day with meals    omeprazole (PriLOSEC OTC) 20 MG tablet Take 20 mg by mouth daily    OneTouch Delica Lancets 94H MISC Check blood sugars once daily  Please substitute with appropriate alternative as covered by patient's insurance  Dx: E11 65    rosuvastatin (CRESTOR) 5 mg tablet Take 1 tablet (5 mg total) by mouth daily    sildenafil (VIAGRA) 100 mg tablet Take 1 tablet (100 mg total) by mouth daily as needed for erectile dysfunction       Objective     /82 (BP Location: Left arm, Patient Position: Sitting, Cuff Size: Large)   Pulse 79   Temp (!) 97 1 °F (36 2 °C) (Tympanic)   Ht 6' 3" (1 905 m)   Wt 124 kg (272 lb 9 6 oz)   SpO2 97%   BMI 34 07 kg/m²     Physical Exam  Vitals reviewed  Constitutional:       Comments: This is a 60-year-old white male who appears his stated age  He is pleasant, cooperative, and in no distress  HENT:      Head: Normocephalic and atraumatic  Right Ear: Tympanic membrane, ear canal and external ear normal  There is no impacted cerumen  Left Ear: Tympanic membrane, ear canal and external ear normal  There is no impacted cerumen  Mouth/Throat:      Mouth: Mucous membranes are moist       Pharynx: Oropharynx is clear  No oropharyngeal exudate or posterior oropharyngeal erythema  Eyes:      General: No scleral icterus  Right eye: No discharge  Left eye: No discharge  Conjunctiva/sclera: Conjunctivae normal       Pupils: Pupils are equal, round, and reactive to light  Neck:      Comments: No thyromegaly  Cardiovascular:      Rate and Rhythm: Normal rate and regular rhythm  Heart sounds: Normal heart sounds  No murmur heard  No friction rub  No gallop     Pulmonary:      Effort: Pulmonary effort is normal  No respiratory distress  Breath sounds: Normal breath sounds  No stridor  No wheezing, rhonchi or rales  Abdominal:      General: Bowel sounds are normal  There is no distension  Palpations: Abdomen is soft  There is no mass  Tenderness: There is no abdominal tenderness  There is no guarding  Comments: There is no organomegaly   Musculoskeletal:      Cervical back: Neck supple  Right lower leg: No edema  Left lower leg: No edema  Comments: No tenderness or swelling in the shoulders  There is decreased active range of motion with full passive range of motion of the right shoulder  Lymphadenopathy:      Cervical: No cervical adenopathy  Psychiatric:         Mood and Affect: Mood normal          Behavior: Behavior normal          Thought Content:  Thought content normal          Judgment: Judgment normal      Nandini Moran DO

## 2022-09-26 NOTE — ASSESSMENT & PLAN NOTE
Lab Results   Component Value Date    HGBA1C 5 9 (H) 09/16/2022   Patient has lowered his hemoglobin A1c from 7 8-5 9  He has done terrific  His fasting blood glucose was noted to be 123  I encouraged the patient to continue to try to lose weight, walk for exercise, watch his diet  For now, I will continue the patient on metformin  I will consider decreasing the dose  I told the patient if he continues to do well it is possible that we may be able to discontinue the medication at some point  I do recommend he see Ophthalmology for dilated eye exam   We also discussed routine diabetic foot care  I ordered reaching fasting blood work for his next office visit

## 2022-09-26 NOTE — ASSESSMENT & PLAN NOTE
I reviewed the patient's physical therapy note  He is improving, though slowly  I told him I will do want him to see Orthopedics for their opinion  If the patient fails to improve or worsens, he will need to be referred back to see Orthopedics  I advised the patient to continue to rest his shoulder

## 2022-09-26 NOTE — ASSESSMENT & PLAN NOTE
I ordered a fasting lipid panel for the patient's next office visit  His goal LDL cholesterol is less than 70   He should continue rosuvastatin 5 mg daily

## 2022-09-26 NOTE — ASSESSMENT & PLAN NOTE
Patient has hypertension  His blood pressure has also improved with the weight that he is lost   The patient will continue amlodipine 5 mg daily and lisinopril 40 mg daily  Please note, I did check the patient's blood pressure myself  I found his blood pressure to be 124/82

## 2022-09-27 ENCOUNTER — OFFICE VISIT (OUTPATIENT)
Dept: PHYSICAL THERAPY | Facility: CLINIC | Age: 62
End: 2022-09-27
Payer: COMMERCIAL

## 2022-09-27 DIAGNOSIS — M75.101 ROTATOR CUFF SYNDROME OF RIGHT SHOULDER: Primary | ICD-10-CM

## 2022-09-27 DIAGNOSIS — M75.52 ACUTE BURSITIS OF LEFT SHOULDER: ICD-10-CM

## 2022-09-27 PROCEDURE — 97110 THERAPEUTIC EXERCISES: CPT

## 2022-09-27 NOTE — PROGRESS NOTES
Daily Note     Today's date: 2022  Patient name: Mounika Greco  : 1960  MRN: [id number]  Referring provider: Lawanda Smith DO  Dx:   Encounter Diagnosis     ICD-10-CM    1  Rotator cuff syndrome of right shoulder  M75 101    2  Acute bursitis of left shoulder  M75 52                   Subjective: Patient reports bilateral shoulders where sore after flu injection  He reports hurt both Saturday and   Reports shoulders feel so much looser after stretching  Objective: See treatment diary below      Assessment: Mounika Greco appears to have improved motion  Painful arc appears to have decreased L>R  Continues with pain at end range all planes in bilateral shoulder  Continued PT should benefit  Plan: Continue per plan of care  Precautions: DM, HTN     Daily Treatment Diary: 20 visit max per calendar year!!!!      Initial Evaluation Date: 22  Compliance 9/7  9/13  9/15  9/20  9/22  9/27           Visit Number 1 2  3  4  5  6           Re-Eval  IE                 [address] [place] [address] Captured                                Manuals 9/7 9/13 9/15 9/20 9/22 9/27   Joint work R shoulder distraction, PROM all planes R shoulder distraction, PROM all planes R shld distraction, PROM all planes, inf mob gr 1-2 R shld distraction, PROM all planes, inf mob gr 1-2 r shoulder PROM r shoulder PROM   ST   subscap TpR subscap TpR subscap TpR subscap TpR   flexibility                  Neuro Re-Ed         tband ext                           Rhythmic stability                                    Ther Ex         UBE/pulleys  10' 5' 5' 5' UBE, 3'pulleys 5' UBE, 3'pulleys   Stick flex x10 x10 x15 x15 hep -   Stick ER  x10 Table 90/90 position work 10x10" Table 90/90 position work 10x10"+ hep -   Table slide x10 10"x10 ea 15x10" ea 15x10" ea hep -   scap retraction x10 10"x10       olivier levator stretch  nv       olivier upper trap stretch  10"x10       Supine scap retraction 2-way   gtb 2x20 ea gtb 2x10 ea gtb x20 ea gtb x20 ea   isometric   Ir/er sub-max 10x 4" b/l Ir/er sub-max 10x 10" b/l Manual rhythmic stability ir/er and balance position supine x6' Manual rhythmic stability ir/er and balance position supine x6'   tband rows   x20 gtb x20 gtb x30 gtb x30 gtb   S/l er     2x10, 2x10 2# 2x10 2#   S/l abd     x20 modified range x20 modified range   Prone rows      5# 2x10 ea   Prone ext      2x10 ea   Prone horiz abd      2x10 ea   Supine serratus punch     2# d/c    bicep         tricep     bktb x30 bktb x30   Ther Activity                           Modalities                             Access Code: [email or web address]  URL: [email or web address]  Date: 09/07/2022  Prepared by: Patti Dean    Exercises  · Seated Shoulder Flexion Towel Slide at Table Top Full Range of Motion - 1 x daily - 7 x weekly - 3 sets - 10 reps  · Supine Shoulder Flexion Extension AAROM with Dowel - 1 x daily - 7 x weekly - 3 sets - 10 reps  · Seated Scapular Retraction - 1 x daily - 7 x weekly - 3 sets - 10 reps

## 2022-09-29 ENCOUNTER — OFFICE VISIT (OUTPATIENT)
Dept: PHYSICAL THERAPY | Facility: CLINIC | Age: 62
End: 2022-09-29
Payer: COMMERCIAL

## 2022-09-29 DIAGNOSIS — M75.52 ACUTE BURSITIS OF LEFT SHOULDER: ICD-10-CM

## 2022-09-29 DIAGNOSIS — M75.101 ROTATOR CUFF SYNDROME OF RIGHT SHOULDER: Primary | ICD-10-CM

## 2022-09-29 PROCEDURE — 97110 THERAPEUTIC EXERCISES: CPT | Performed by: PHYSICAL THERAPIST

## 2022-09-29 NOTE — PROGRESS NOTES
Daily Note     Today's date: 2022  Patient name: Jazmin Leon  : 1960  MRN: [id number]  Referring provider: Jm Fiore DO  Dx:   Encounter Diagnosis     ICD-10-CM    1  Rotator cuff syndrome of right shoulder  M75 101    2  Acute bursitis of left shoulder  M75 52                   Subjective: today was better than last few and last night sleep was better, sore when woke but not like usual      Objective: See treatment diary below      Assessment: pt struggles with consistent ROM improvements  Can make short term with certain TE  Will continue with current POC and goal of improving active mobility  If no change in 2-3 visits will refer back to MD       Plan: see above     Precautions: DM, HTN     Daily Treatment Diary: 20 visit max per calendar year!!!!      Initial Evaluation Date: 22  Compliance 9/7  9/13  9/15  9/20  9/22  9/27  9/29         Visit Number 1 2  3  4  5  6  7         Re-Eval  IE                 [address] [place] [address] Captured                                Manuals 9/29 9/13 9/15 9/20 9/22 9/27   Joint work  R shoulder distraction, PROM all planes R shld distraction, PROM all planes, inf mob gr 1-2 R shld distraction, PROM all planes, inf mob gr 1-2 r shoulder PROM r shoulder PROM   ST   subscap TpR subscap TpR subscap TpR subscap TpR   flexibility                  Neuro Re-Ed         tband ext                  bodyblade Ir/er and supine 90 3' total        Rhythmic stability                                    Ther Ex         UBE/pulleys 5' UBE, 3' pulleys 10' 5' 5' 5' UBE, 3'pulleys 5' UBE, 3'pulleys   Stick flex - x10 x15 x15 hep -   Stick ER - x10 Table 90/90 position work 10x10" Table 90/90 position work 10x10"+ hep -   Table slide - 10"x10 ea 15x10" ea 15x10" ea hep -   scap retraction  10"x10       olivier levator stretch  nv       olivier upper trap stretch  10"x10       Supine scap retraction 2-way btb x20  gtb 2x20 ea gtb 2x10 ea gtb x20 ea gtb x20 ea   isometric Manual r/s ir/er and balance position supine x6'  Ir/er sub-max 10x 4" b/l Ir/er sub-max 10x 10" b/l Manual rhythmic stability ir/er and balance position supine x6' Manual rhythmic stability ir/er and balance position supine x6'   tband rows   x20 gtb x20 gtb x30 gtb x30 gtb   S/l er 3x10 2# b/l    2x10, 2x10 2# 2x10 2#   S/l abd x10 (dc)    x20 modified range x20 modified range   Prone rows 5# 2x10     5# 2x10 ea   Prone ext      2x10 ea   Prone horiz abd 2x10 ea     2x10 ea   Supine serratus punch     2# d/c    Supine shoulder flex x10                 bicep         tricep     bktb x30 bktb x30   Ther Activity                           Modalities                             Access Code: [email or web address]  URL: [email or web address]  Date: 09/07/2022  Prepared by: Juanell Vargas    Exercises  · Seated Shoulder Flexion Towel Slide at Table Top Full Range of Motion - 1 x daily - 7 x weekly - 3 sets - 10 reps  · Supine Shoulder Flexion Extension AAROM with Dowel - 1 x daily - 7 x weekly - 3 sets - 10 reps  · Seated Scapular Retraction - 1 x daily - 7 x weekly - 3 sets - 10 reps

## 2022-10-04 ENCOUNTER — EVALUATION (OUTPATIENT)
Dept: PHYSICAL THERAPY | Facility: CLINIC | Age: 62
End: 2022-10-04
Payer: COMMERCIAL

## 2022-10-04 DIAGNOSIS — M75.52 ACUTE BURSITIS OF LEFT SHOULDER: ICD-10-CM

## 2022-10-04 DIAGNOSIS — M75.101 ROTATOR CUFF SYNDROME OF RIGHT SHOULDER: Primary | ICD-10-CM

## 2022-10-04 PROCEDURE — 97110 THERAPEUTIC EXERCISES: CPT

## 2022-10-04 PROCEDURE — 97140 MANUAL THERAPY 1/> REGIONS: CPT

## 2022-10-04 NOTE — PROGRESS NOTES
PT Re-Evaluation     Today's date: 10/4/2022  Patient name: Nikunj Phan  : 1960  MRN: [id number]  Referring provider: Khadijah Joseph DO  Dx:   Encounter Diagnosis     ICD-10-CM    1  Rotator cuff syndrome of right shoulder  M75 101    2  Acute bursitis of left shoulder  M75 52                   Assessment  Assessment details:   Pt has shown improvement both subjectively and objectively with current PT treatment  He has improved with functional mobility  I feel he will continue to benefit from skilled PT services to further improve function and progress towards outlined goals  Impairments: abnormal or restricted ROM, activity intolerance, impaired physical strength and pain with function  Functional limitations: shoulder motion above chest level, lifting, sleeping  Symptom irritability: moderateUnderstanding of Dx/Px/POC: good   Prognosis: fair    Goals  ST-4 weeks - progressing towards  Active motion to 140 deg R shoulder  Reduce pain with daily activity and sleeping by 50%  Independent with HEP focused on mobility    LT-8 weeks - not met  Able to resist flex and abd for 4/5 or greater by DC  Independent with RTC and scapular strength  Reduce pain by 75% with typical daily activity  Minimal to no pain donning/doffing shirt    Plan  Plan details: TE, NMR, TA, MT, self education, and modalities as needed in order to progress through skilled PT focused on  ROM, strength, balance, coordination   Patient would benefit from: skilled physical therapy  Planned modality interventions: cryotherapy and thermotherapy: hydrocollator packs  Planned therapy interventions: manual therapy, neuromuscular re-education, self care, therapeutic activities, therapeutic exercise and home exercise program  Frequency: 2x week  Duration in weeks: 6  Treatment plan discussed with: patient        Subjective Evaluation    History of Present Illness  Patient reports he is feeling like he is getting better each day   Reports able to lift arm higher after each visit     Pain  At best pain ratin  At worst pain ratin    Patient Goals  Patient goals for therapy: decreased pain, increased motion, increased strength, independence with ADLs/IADLs and return to sport/leisure activities          Objective     General Comments:      Shoulder Comments   Cervical screen  [facility]    Shoulder AROM R/L  Flex 125/150  Abd 75/150  ER T2/T2  BB L1/L1        Shoulder PROM R/L  Flex 135/150  Abd 110/150  Er 75/75  Ir 45/45      MMT R/L   Flex/abd: 3+/5  ER: 4/5  IR: 5/5      Palpation  Slight tenderness over supra tendon  Post RTC tender      Special tests - er lag (-)                        Precautions: DM, HTN     Daily Treatment Diary: 20 visit max per calendar year!!!!      Initial Evaluation Date: 22  Compliance 9/7  9/13  9/15  9/20  9/22  9/27  9/29  10/4       Visit Number 1 2  3  4  5  6  7 8       Re-Eval  IE                 [address] [facility] Captured                                Manuals 10/4     9/27   Joint work r shoulder PROM     r shoulder PROM   ST subscap TpR     subscap TpR   flexibility                  Neuro Re-Ed         tband ext                  bodyblade         Rhythmic stability                                    Ther Ex         UBE/pulleys 4'/4' UBE  3' pulleys     5' UBE, 3'pulleys   Stick flex -     -   Stick ER -     -   Table slide -     -   scap retraction         olivier levator stretch         olivier upper trap stretch         Supine scap retraction 2-way gtb x30 ea     gtb x20 ea   isometric Manual rhythmic stability ir/er and balance position supine x6'     Manual rhythmic stability ir/er and balance position supine x6'   tband rows x30 gtb     x30 gtb   S/l er 2x10 2#     2x10 2#   S/l abd x20 modified range     x20 modified range   Prone rows 5# 2x10 ea     5# 2x10 ea   Prone ext 2x10 ea     2x10 ea   Prone horiz abd 2x10 ea     2x10 ea   Supine serratus punch         Supine shoulder flex         Towel wall slides 2x10        bicep         tricep bktb x30     bktb x30   Ther Activity                           Modalities                             Access Code: [email or web address]  URL: [email or web address]  Date: 09/07/2022  Prepared by: Rupesh Martel    Exercises  · Seated Shoulder Flexion Towel Slide at Table Top Full Range of Motion - 1 x daily - 7 x weekly - 3 sets - 10 reps  · Supine Shoulder Flexion Extension AAROM with Dowel - 1 x daily - 7 x weekly - 3 sets - 10 reps  · Seated Scapular Retraction - 1 x daily - 7 x weekly - 3 sets - 10 reps

## 2022-10-06 ENCOUNTER — OFFICE VISIT (OUTPATIENT)
Dept: PHYSICAL THERAPY | Facility: CLINIC | Age: 62
End: 2022-10-06
Payer: COMMERCIAL

## 2022-10-06 DIAGNOSIS — M75.52 ACUTE BURSITIS OF LEFT SHOULDER: ICD-10-CM

## 2022-10-06 DIAGNOSIS — M75.101 ROTATOR CUFF SYNDROME OF RIGHT SHOULDER: Primary | ICD-10-CM

## 2022-10-06 PROCEDURE — 97110 THERAPEUTIC EXERCISES: CPT

## 2022-10-06 PROCEDURE — 97140 MANUAL THERAPY 1/> REGIONS: CPT

## 2022-10-06 NOTE — PROGRESS NOTES
Daily Note     Today's date: 10/6/2022  Patient name: Yudith Lucas  : 1960  MRN: [id number]  Referring provider: Elsy Aguayo DO  Dx:   Encounter Diagnosis     ICD-10-CM    1  Rotator cuff syndrome of right shoulder  M75 101    2  Acute bursitis of left shoulder  M75 52                   Subjective: Patient reports shoulder are stiff  Pain is decreased overall  Objective: See treatment diary below      Assessment: Yudith Lucas continues with limited ROM but decreased pain in available range  Appears to get relief from mobilizations  Continued treatment to improve ROM and function indicated  Plan: Continue per plan of care  Precautions: DM, HTN     Daily Treatment Diary: 20 visit max per calendar year!!!!      Initial Evaluation Date: 22  Compliance 9/7  9/13  9/15  9/20  9/22  9/27  9/29  10/4  10/6     Visit Number 1 2  3  4  5  6  7 8  9     Re-Eval  IE                 [address] [facility] Captured                                Manuals 10/4 10/6    9/27   Joint work r shoulder PROM B shoulder PROM    r shoulder PROM   ST subscap TpR 15'    subscap TpR   flexibility                  Neuro Re-Ed         tband ext                  bodyblade         Rhythmic stability                                    Ther Ex         UBE/pulleys 4'/4' UBE  3' pulleys 4'/4' UBE  3' pulleys    5' UBE, 3'pulleys   Stick flex -     -   Stick ER -     -   Table slide -     -   scap retraction         olivier levator stretch         olivier upper trap stretch         Supine scap retraction 2-way gtb x30 ea gtb x30 ea    gtb x20 ea   isometric Manual rhythmic stability ir/er and balance position supine x6' Manual rhythmic stability ir/er and balance position supine x6'    Manual rhythmic stability ir/er and balance position supine x6'   tband rows x30 gtb x30 gtb    x30 gtb   S/l er 2x10 2# 2x10 2#    2x10 2#   S/l abd x20 modified range x20 modified range    x20 modified range   Prone rows 5# 2x10 ea 10# 2x10 ea    5# 2x10 ea   Prone ext 2x10 ea 2x10 ea    2x10 ea   Prone horiz abd 2x10 ea 2x10 ea    2x10 ea   Supine serratus punch         Supine shoulder flex         Towel wall slides 2x10 2x10       bicep         tricep bktb x30 bktb x30    bktb x30   Ther Activity                           Modalities                             Access Code: [email or web address]  URL: [email or web address]  Date: 09/07/2022  Prepared by: Lea Boyle    Exercises  · Seated Shoulder Flexion Towel Slide at Table Top Full Range of Motion - 1 x daily - 7 x weekly - 3 sets - 10 reps  · Supine Shoulder Flexion Extension AAROM with Dowel - 1 x daily - 7 x weekly - 3 sets - 10 reps  · Seated Scapular Retraction - 1 x daily - 7 x weekly - 3 sets - 10 reps

## 2022-10-11 ENCOUNTER — OFFICE VISIT (OUTPATIENT)
Dept: PHYSICAL THERAPY | Facility: CLINIC | Age: 62
End: 2022-10-11
Payer: COMMERCIAL

## 2022-10-11 DIAGNOSIS — M75.101 ROTATOR CUFF SYNDROME OF RIGHT SHOULDER: Primary | ICD-10-CM

## 2022-10-11 DIAGNOSIS — M75.52 ACUTE BURSITIS OF LEFT SHOULDER: ICD-10-CM

## 2022-10-11 PROCEDURE — 97110 THERAPEUTIC EXERCISES: CPT

## 2022-10-11 PROCEDURE — 97140 MANUAL THERAPY 1/> REGIONS: CPT

## 2022-10-11 NOTE — PROGRESS NOTES
Daily Note     Today's date: 10/11/2022  Patient name: Susan Do  : 1960  MRN: [id number]  Referring provider: Bryant Amezquita DO  Dx:   Encounter Diagnosis     ICD-10-CM    1  Rotator cuff syndrome of right shoulder  M75 101    2  Acute bursitis of left shoulder  M75 52                   Subjective: Patient reports each time he leaves PT he feels more relief than the visit before  Objective: See treatment diary below      Assessment: Susan Do appears to have improving ROM and decreasing discomfort  Limited passive range continues with end range pain in all motions but less pain through available motion  Continued PT indicated  Plan: Continue per plan of care  Precautions: DM, HTN     Daily Treatment Diary: 20 visit max per calendar year!!!!      Initial Evaluation Date: 22  Compliance 9/7  9/13  9/15  9/20  9/22  9/27  9/29  10/4  10/6  10/11   Visit Number 1 2  3  4  5  6  7 8  9  10   Re-Eval  IE                 [address] Captured                                Manuals 10/4 10/6 10/11      Joint work r shoulder PROM B shoulder PROM B shoulder PROM      ST subscap TpR 15' 15'      flexibility                  Neuro Re-Ed         tband ext                  bodyblade         Rhythmic stability                                    Ther Ex         UBE/pulleys 4'/4' UBE  3' pulleys 4'/4' UBE  3' pulleys 4'/4' UBE  3' pulleys      Stick flex -        Stick ER -        Table slide -        scap retraction         olivier levator stretch         olivier upper trap stretch         Wall pushups   2x10      Supine scap retraction 2-way gtb x30 ea gtb x30 ea gtb x30 ea      isometric Manual rhythmic stability ir/er and balance position supine x6' Manual rhythmic stability ir/er and balance position supine x6' Manual rhythmic stability ir/er and balance position supine x6'      tband rows x30 gtb x30 gtb BTB  x30      S/l er 2x10 2# 2x10 2# 3#  2x10      S/l abd x20 modified range x20 modified range x20 modified range      Prone rows 5# 2x10 ea 10# 2x10 ea 10# 2x10 ea      Prone ext 2x10 ea 2x10 ea 3#  2x10 ea      Prone horiz abd 2x10 ea 2x10 ea 2x10 ea      Supine serratus punch         Supine shoulder flex         Towel wall slides 2x10 2x10 2x10      bicep         tricep bktb x30 bktb x30 bktb 3x15      Ther Activity                           Modalities                             Access Code: [email or web address]  URL: [email or web address]  Date: 09/07/2022  Prepared by: Rosie Vargas    Exercises  · Seated Shoulder Flexion Towel Slide at Table Top Full Range of Motion - 1 x daily - 7 x weekly - 3 sets - 10 reps  · Supine Shoulder Flexion Extension AAROM with Dowel - 1 x daily - 7 x weekly - 3 sets - 10 reps  · Seated Scapular Retraction - 1 x daily - 7 x weekly - 3 sets - 10 reps

## 2022-10-13 ENCOUNTER — OFFICE VISIT (OUTPATIENT)
Dept: PHYSICAL THERAPY | Facility: CLINIC | Age: 62
End: 2022-10-13
Payer: COMMERCIAL

## 2022-10-13 DIAGNOSIS — M75.101 ROTATOR CUFF SYNDROME OF RIGHT SHOULDER: ICD-10-CM

## 2022-10-13 DIAGNOSIS — M75.52 ACUTE BURSITIS OF LEFT SHOULDER: Primary | ICD-10-CM

## 2022-10-13 PROCEDURE — 97140 MANUAL THERAPY 1/> REGIONS: CPT

## 2022-10-13 PROCEDURE — 97110 THERAPEUTIC EXERCISES: CPT

## 2022-10-13 NOTE — PROGRESS NOTES
Daily Note     Today's date: 10/13/2022  Patient name: Jack Valle  : 1960  MRN: [id number]  Referring provider: Jacqueline Leung DO  Dx:   Encounter Diagnosis     ICD-10-CM    1  Acute bursitis of left shoulder  M75 52    2  Rotator cuff syndrome of right shoulder  M75  101                   Subjective: Patient reports less discomfort with use after each visit  Reports able to use it so much more for ADLs  Objective: See treatment diary below      Assessment:   Jack Valle appears to have improved motion  Painful arc appears to have decreased L>R  Continues with pain at end range all planes in bilateral shoulder  Continued PT should benefit  Plan: Continue per plan of care  Precautions: DM, HTN     Daily Treatment Diary: 20 visit max per calendar year!!!!      Initial Evaluation Date: 22  Compliance 10/4 10/6 10/11 10/13         Visit Number 8 9 10 11         Re-Eval              [address] [facility] [address]                           Manuals 10/4 10/6 10/11 10/13     Joint work r shoulder PROM B shoulder PROM B shoulder PROM B shoulder PROM     ST subscap TpR 15' 15' 15'     flexibility                  Neuro Re-Ed         tband ext                  bodyblade         Rhythmic stability                                    Ther Ex         UBE/pulleys 4'/4' UBE  3' pulleys 4'/4' UBE  3' pulleys 4'/4' UBE  3' pulleys 4'/4' UBE  3' pulleys     Stick flex -        Stick ER -        Table slide -        scap retraction         olivier levator stretch         olivier upper trap stretch         Wall pushups   2x10 2x10     Supine scap retraction 2-way gtb x30 ea gtb x30 ea gtb x30 ea gtb x30 ea     isometric Manual rhythmic stability ir/er and balance position supine x6' Manual rhythmic stability ir/er and balance position supine x6' Manual rhythmic stability ir/er and balance position supine x6' Manual rhythmic stability ir/er and balance position supine x6'     tband rows/ extension x30 gtb x30 gtb BTB  x30 BTB  x30     S/l er 2x10 2# 2x10 2# 3#  2x10 3#  2x10 ea     S/l abd x20 modified range x20 modified range x20 modified range x20 modified range ea     Prone rows 5# 2x10 ea 10# 2x10 ea 10# 2x10 ea 10# 2x10 ea     Prone ext 2x10 ea 2x10 ea 3#  2x10 ea 3#  2x10 ea     Prone horiz abd 2x10 ea 2x10 ea 2x10 ea 2x10 ea     Supine serratus punch         Supine shoulder flex         Towel wall slides 2x10 2x10 2x10 2x10     bicep         tricep bktb x30 bktb x30 bktb 3x15 bktb 3x15     Ther Activity                           Modalities                             Access Code: [email or web address]  URL: [email or web address]  Date: 09/07/2022  Prepared by: Sangita Mcclelland    Exercises  · Seated Shoulder Flexion Towel Slide at Table Top Full Range of Motion - 1 x daily - 7 x weekly - 3 sets - 10 reps  · Supine Shoulder Flexion Extension AAROM with Dowel - 1 x daily - 7 x weekly - 3 sets - 10 reps  · Seated Scapular Retraction - 1 x daily - 7 x weekly - 3 sets - 10 reps

## 2022-10-14 ENCOUNTER — OFFICE VISIT (OUTPATIENT)
Dept: ENDOCRINOLOGY | Facility: OTHER | Age: 62
End: 2022-10-14
Payer: COMMERCIAL

## 2022-10-14 VITALS — BODY MASS INDEX: 33.77 KG/M2 | WEIGHT: 270.2 LBS

## 2022-10-14 DIAGNOSIS — E11.9 TYPE 2 DIABETES MELLITUS WITHOUT COMPLICATION, WITHOUT LONG-TERM CURRENT USE OF INSULIN (HCC): Primary | ICD-10-CM

## 2022-10-14 PROCEDURE — 97803 MED NUTRITION INDIV SUBSEQ: CPT

## 2022-10-14 NOTE — PATIENT INSTRUCTIONS
Eat 3 meals per day, 4-5 hours apart  No meal skipping  Eat 30-45 grams of carbohydrate per meal, and no more than 15 grams per snack      Continue regular exercise for at least 30 minutes per day or 150 minutes of moderate exercise per week, pending physician approval      Complete 3-day food log and return completed log at the follow up appointment

## 2022-10-14 NOTE — PROGRESS NOTES
Medical Nutrition Therapy        Assessment    Visit Type: Follow-up visit  Chief complaint/Medical Diagnosis/reason for visit E11 9 (ICD-10-CM) - Type 2 diabetes mellitus without complication, without long-term current use of insulin ([facility] )    SANDRINE Garcia was seen in person for his 3 month follow-up MNT appointment  Shiraz reported checking his BG 1x per day, usually in the morning at fasting  Shiraz reports most days his FBG <120  Currently Shiraz performs exercise with a physical therapist 2x/ week for 1 hr and performs the same exercises at home every day for 20-30 minutes  Belle Garcia returned his completed food log with 2 days worth of data  Problems identified in food recall include inconsistent carbohydrate intake  Consumption of carbohydrates ranges from 0 to 94 grams per meal   Provided patient with a 1700 calorie meal plan to assist with consistency, balance and portion control  Encouraged the consumption of regular meals at regular times  Advised patient to keep carbohydrate intake to 30-45 grams per meal and 15 per snack to assist with glycemic control  Explained how low carb and high carb influence blood sugars through different mechanisms  Patient agreed to keep daily food logs and return them in 6 months for assessment  RD will remain available for further dietary questions/concerns       Ht Readings from Last 1 Encounters:   09/23/22 6' 3" (1 905 m)     Wt Readings from Last 3 Encounters:   09/23/22 124 kg (272 lb 9 6 oz)   09/01/22 125 kg (275 lb 8 oz)   08/11/22 126 kg (276 lb 12 8 oz)     Weight Change: Yes 3 lbs intentional weight loss over about 1 month    Barriers to Learning: no barriers    Do you follow any special diet presently?: Yes - only trying to follow RD recommendations  Who shops: spouse  Who cooks: patient and spouse    Food Log: Completed via scanned food record      Exercise goes to PT 2x/ week for 1 hr (due to hurt shoulders) & also exercises the same physical therapy at home for 20-30 min per day    Calorie needs 1700 kcals/day Carbs: 30-45 g/meal, 15 g/snack         Nutrition Diagnosis:  Limited adherence to nutrition related recommendations  related to Food and nutrition-related knowledge deficit concerning how to make nutrition related changes as evidenced by Expected food and nutrition-related outcomes are not achieved    Intervention: label reading, behavior modification strategies, carbohydrate counting, meal timing, exercise guidelines and food diary     Treatment Goals: Patient understands education and recommendations, Patient will monitor food intake daily with tracker, Patient will consume 3 meals a day, Patient will count carbohydrates, Patient will exercise and Patient will monitor blood glucose    Monitoring and evaluation:    Term code indicator  FH 1 3 2 Food Intake Criteria: Eat 3 meals per day, 4-5 hours apart  No meal skipping  Term code indicator  FH 1 6 3 Carbohydrate Intake Criteria: Eat 30-45 grams of carbohydrate per meal, and no more than 15 grams per snack  Term code indicator  CH 2 2 Treatments/Therapy/Alternative Medicine Criteria: Continue regular exercise for at least 30 minutes per day or 150 minutes of moderate exercise per week, pending physician approval      Materials Provided: 3-day food log    Patient’s Response to Instruction:  Dinorah Holland  Expected Compliancegood    Begin Time: 9:35 am  End Time: 10:12 am  Referring Provider: Augie Morgan DO    Thank you for coming to the [facility] [address] [place] [address] for education today  Please feel free to call with any questions or concerns      Rosina Wick MS, RDN, LDN  [address]  [facility] [phone]

## 2022-10-18 ENCOUNTER — OFFICE VISIT (OUTPATIENT)
Dept: PHYSICAL THERAPY | Facility: CLINIC | Age: 62
End: 2022-10-18
Payer: COMMERCIAL

## 2022-10-18 DIAGNOSIS — M75.52 ACUTE BURSITIS OF LEFT SHOULDER: Primary | ICD-10-CM

## 2022-10-18 DIAGNOSIS — M75.101 ROTATOR CUFF SYNDROME OF RIGHT SHOULDER: ICD-10-CM

## 2022-10-18 PROCEDURE — 97110 THERAPEUTIC EXERCISES: CPT

## 2022-10-18 PROCEDURE — 97140 MANUAL THERAPY 1/> REGIONS: CPT

## 2022-10-18 NOTE — PROGRESS NOTES
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      Initial Evaluation Date: 22  Compliance 10/4 10/6 10/11 10/13 10/18        Visit Number 8 9 10 11 12        Re-Eval              [address] [facility] Capt[address]                           Manuals 10/4 10/6 10/11 10/13 10/18    Joint work r shoulder PROM B shoulder PROM B shoulder PROM B shoulder PROM B shoulder PROM    ST subscap TpR 15' 15' 15' 15'    flexibility                  Neuro Re-Ed         tband ext                  bodyblade         Rhythmic stability                                    Ther Ex         UBE/pulleys 4'/4' UBE  3' pulleys 4'/4' UBE  3' pulleys 4'/4' UBE  3' pulleys 4'/4' UBE  3' pulleys 4'/4' UBE  3' pulleys    Stick flex -        Stick ER -        Table slide -        scap retraction         olivier levator stretch         olivier upper trap stretch         Wall pushups   2x10 2x10 2x10    Supine scap retraction 2-way gtb x30 ea gtb x30 ea gtb x30 ea gtb x30 ea gtb x30 ea    isometric Manual rhythmic stability ir/er and balance position supine x6' Manual rhythmic stability ir/er and balance position supine x6' Manual rhythmic stability ir/er and balance position supine x6' Manual rhythmic stability ir/er and balance position supine x6' Manual rhythmic stability ir/er and balance position supine x6'    tband rows/ extension x30 gtb x30 gtb BTB  x30 BTB  x30 BTB  x30    S/l er 2x10 2# 2x10 2# 3#  2x10 3#  2x10 ea 3#  2x10 ea    S/l abd x20 modified range x20 modified range x20 modified range x20 modified range ea x20 modified range ea    Prone rows 5# 2x10 ea 10# 2x10 ea 10# 2x10 ea 10# 2x10 ea 10# 3x10 ea    Prone ext 2x10 ea 2x10 ea 3#  2x10 ea 3#  2x10 ea 3#  2x10 ea    Prone horiz abd 2x10 ea 2x10 ea 2x10 ea 2x10 ea 2x10 ea    Supine serratus punch         Supine shoulder flex         Towel wall slides 2x10 2x10 2x10 2x10 2x10    bicep         tricep bktb x30 bktb x30 bktb 3x15 bktb 3x15 bktb 3x15    Ther Activity                           Modalities                             Access Code: [email or web address]  URL: [email or web address]  Date: 09/07/2022  Prepared by: Evelyn Shirley    Exercises  · Seated Shoulder Flexion Towel Slide at Table Top Full Range of Motion - 1 x daily - 7 x weekly - 3 sets - 10 reps  · Supine Shoulder Flexion Extension AAROM with Dowel - 1 x daily - 7 x weekly - 3 sets - 10 reps  · Seated Scapular Retraction - 1 x daily - 7 x weekly - 3 sets - 10 reps

## 2022-10-19 DIAGNOSIS — I10 ESSENTIAL HYPERTENSION, BENIGN: ICD-10-CM

## 2022-10-19 RX ORDER — LISINOPRIL 40 MG/1
TABLET ORAL
Qty: 90 TABLET | Refills: 3 | Status: SHIPPED | OUTPATIENT
Start: 2022-10-19

## 2022-10-20 ENCOUNTER — OFFICE VISIT (OUTPATIENT)
Dept: PHYSICAL THERAPY | Facility: CLINIC | Age: 62
End: 2022-10-20
Payer: COMMERCIAL

## 2022-10-20 DIAGNOSIS — M75.52 ACUTE BURSITIS OF LEFT SHOULDER: Primary | ICD-10-CM

## 2022-10-20 DIAGNOSIS — M75.101 ROTATOR CUFF SYNDROME OF RIGHT SHOULDER: ICD-10-CM

## 2022-10-20 PROCEDURE — 97110 THERAPEUTIC EXERCISES: CPT | Performed by: PHYSICAL THERAPIST

## 2022-10-20 PROCEDURE — 97140 MANUAL THERAPY 1/> REGIONS: CPT | Performed by: PHYSICAL THERAPIST

## 2022-10-20 NOTE — PROGRESS NOTES
Daily Note     Today's date: 10/20/2022  Patient name: Everette Mejia  : 1960  MRN: [id number]  Referring provider: Rob Valerio DO  Dx:   Encounter Diagnosis     ICD-10-CM    1  Acute bursitis of left shoulder  M75 52    2  Rotator cuff syndrome of right shoulder  M75  101                   Subjective: sore from last treatment      Objective: See treatment diary below      Assessment: soreness passive flex >150 R and er at 90 deg  Had some dec pain with passive scap upward rotation  Added supine flex 1#  Tolerating TE well  Continue goal of increasing functional ROM and strength       Plan: continue current POC     Precautions: DM, HTN     Daily Treatment Diary: 20 visit max per calendar year!!!!      Initial Evaluation Date: 22  Compliance 10/4 10/6 10/11 10/13 10/18 10/20       Visit Number 8 9 10 11 12 13       Re-Eval              [address] [facility] Capt[address]                           Manuals 10/4 10/6 10/11 10/13 10/18 10/20   Joint work r shoulder PROM B shoulder PROM B shoulder PROM B shoulder PROM B shoulder PROM PROM with subscap TpR and passive scap upward ROT   ST subscap TpR 15' 15' 15' 15'    flexibility                  Neuro Re-Ed         tband ext                  bodyblade         Rhythmic stability                                    Ther Ex         UBE/pulleys 4'/4' UBE  3' pulleys 4'/4' UBE  3' pulleys 4'/4' UBE  3' pulleys 4'/4' UBE  3' pulleys 4'/4' UBE  3' pulleys 4'/4' UBE  3' pulleys   Stick flex -        Stick ER -        Table slide -        scap retraction         olivier levator stretch         olivier upper trap stretch         Wall pushups   2x10 2x10 2x10 2x10   Supine scap retraction 2-way gtb x30 ea gtb x30 ea gtb x30 ea gtb x30 ea gtb x30 ea btb x20 ea   isometric Manual rhythmic stability ir/er and balance position supine x6' Manual rhythmic stability ir/er and balance position supine x6' Manual rhythmic stability ir/er and balance position supine x6' Manual rhythmic stability ir/er and balance position supine x6' Manual rhythmic stability ir/er and balance position supine x6' R/s ir/er and balance position 5'   tband rows/ extension x30 gtb x30 gtb BTB  x30 BTB  x30 BTB  x30 btb x30   S/l er 2x10 2# 2x10 2# 3#  2x10 3#  2x10 ea 3#  2x10 ea 3# 3x10   S/l abd x20 modified range x20 modified range x20 modified range x20 modified range ea x20 modified range ea x20   Prone rows 5# 2x10 ea 10# 2x10 ea 10# 2x10 ea 10# 2x10 ea 10# 3x10 ea 10# 3x10   Prone ext 2x10 ea 2x10 ea 3#  2x10 ea 3#  2x10 ea 3#  2x10 ea 3# 2x10   Prone horiz abd 2x10 ea 2x10 ea 2x10 ea 2x10 ea 2x10 ea 2x10 ea   Supine serratus punch      1# 3x10   Supine shoulder flex      1# 2x10   Towel wall slides 2x10 2x10 2x10 2x10 2x10    bicep         tricep bktb x30 bktb x30 bktb 3x15 bktb 3x15 bktb 3x15    Ther Activity                           Modalities                             Access Code: [email or web address]  URL: [email or web address]  Date: 09/07/2022  Prepared by: Marcin Chung    Exercises  · Seated Shoulder Flexion Towel Slide at Table Top Full Range of Motion - 1 x daily - 7 x weekly - 3 sets - 10 reps  · Supine Shoulder Flexion Extension AAROM with Dowel - 1 x daily - 7 x weekly - 3 sets - 10 reps  · Seated Scapular Retraction - 1 x daily - 7 x weekly - 3 sets - 10 reps

## 2022-10-25 ENCOUNTER — OFFICE VISIT (OUTPATIENT)
Dept: PHYSICAL THERAPY | Facility: CLINIC | Age: 62
End: 2022-10-25
Payer: COMMERCIAL

## 2022-10-25 DIAGNOSIS — M75.101 ROTATOR CUFF SYNDROME OF RIGHT SHOULDER: ICD-10-CM

## 2022-10-25 DIAGNOSIS — M75.52 ACUTE BURSITIS OF LEFT SHOULDER: Primary | ICD-10-CM

## 2022-10-25 PROCEDURE — 97140 MANUAL THERAPY 1/> REGIONS: CPT | Performed by: PHYSICAL THERAPIST

## 2022-10-25 PROCEDURE — 97110 THERAPEUTIC EXERCISES: CPT | Performed by: PHYSICAL THERAPIST

## 2022-10-25 NOTE — PROGRESS NOTES
Daily Note     Today's date: 10/25/2022  Patient name: Tucker Malik  : 1960  MRN: [id number]  Referring provider: Hanh Negron DO  Dx:   Encounter Diagnosis     ICD-10-CM    1  Acute bursitis of left shoulder  M75 52    2  Rotator cuff syndrome of right shoulder  M75  101                   Subjective: shoulders feeling a little better last 2 days  Usually takes 2-3 days after therapy to recover  Active with them every day doing some type of exercise for shoulder      Objective: See treatment diary below      Assessment: improved s/l abd with dec pain and improved fluidity  Supine flex able to have less pinch with slight protraction  Continued with inf capsule mob and incorporated sleeper stretch - instructed on HEP  Plan: progress to 1x/wk due to visit limits  Progress shoulder AROM     Precautions: DM, HTN     Daily Treatment Diary: 20 visit max per calendar year!!!!      Initial Evaluation Date: 22  Compliance 10/4 10/6 10/11 10/13 10/18 10/20 10/25      Visit Number 8 9 10 11 12 13 14      Re-Eval              [address] [facility] Capt[address]                           Manuals 10/25 10/6 10/11 10/13 10/18 10/20   Joint work Inf capsule mob gr 3 B shoulder PROM B shoulder PROM B shoulder PROM B shoulder PROM PROM with subscap TpR and passive scap upward ROT   ST  15' 15' 15' 15'    flexibility Low level ER and IR stretch                 Neuro Re-Ed         tband ext                  bodyblade         Rhythmic stability                                    Ther Ex         UBE/pulleys 4'/4' UBE  3' pulleys 4'/4' UBE  3' pulleys 4'/4' UBE  3' pulleys 4'/4' UBE  3' pulleys 4'/4' UBE  3' pulleys 4'/4' UBE  3' pulleys   Stick flex -        Stick ER -        Table slide -        Sleeper stretch 6x10"        olivier levator stretch         olivier upper trap stretch         Wall pushups   2x10 2x10 2x10 2x10   Supine scap retraction 2-way gtb x30 ea gtb x30 ea gtb x30 ea gtb x30 ea gtb x30 ea btb x20 ea   isometric  Manual rhythmic stability ir/er and balance position supine x6' Manual rhythmic stability ir/er and balance position supine x6' Manual rhythmic stability ir/er and balance position supine x6' Manual rhythmic stability ir/er and balance position supine x6' R/s ir/er and balance position 5'   tband rows/ extension bktb x30 x30 gtb BTB  x30 BTB  x30 BTB  x30 btb x30   S/l er 2x10 3# 2x10 2# 3#  2x10 3#  2x10 ea 3#  2x10 ea 3# 3x10   S/l abd x20 and s/l flex x20 x20 modified range x20 modified range x20 modified range ea x20 modified range ea x20   Prone rows 10# 2x10 ea 10# 2x10 ea 10# 2x10 ea 10# 2x10 ea 10# 3x10 ea 10# 3x10   Prone ext  2x10 ea 3#  2x10 ea 3#  2x10 ea 3#  2x10 ea 3# 2x10   Prone horiz abd 2x10 ea 2x10 ea 2x10 ea 2x10 ea 2x10 ea 2x10 ea   Supine serratus punch 5# 2x10 ea     1# 3x10   Supine shoulder flex x20 ea     1# 2x10   Towel wall slides  2x10 2x10 2x10 2x10    bicep         tricep  bktb x30 bktb 3x15 bktb 3x15 bktb 3x15    Ther Activity                           Modalities                             Access Code: [email or web address]  URL: [email or web address]  Date: 09/07/2022  Prepared by: Dada Ha    Exercises  · Seated Shoulder Flexion Towel Slide at Table Top Full Range of Motion - 1 x daily - 7 x weekly - 3 sets - 10 reps  · Supine Shoulder Flexion Extension AAROM with Dowel - 1 x daily - 7 x weekly - 3 sets - 10 reps  · Seated Scapular Retraction - 1 x daily - 7 x weekly - 3 sets - 10 reps

## 2022-10-27 ENCOUNTER — APPOINTMENT (OUTPATIENT)
Dept: PHYSICAL THERAPY | Facility: CLINIC | Age: 62
End: 2022-10-27

## 2022-11-01 ENCOUNTER — OFFICE VISIT (OUTPATIENT)
Dept: PHYSICAL THERAPY | Facility: CLINIC | Age: 62
End: 2022-11-01

## 2022-11-01 DIAGNOSIS — M75.101 ROTATOR CUFF SYNDROME OF RIGHT SHOULDER: ICD-10-CM

## 2022-11-01 DIAGNOSIS — M75.52 ACUTE BURSITIS OF LEFT SHOULDER: Primary | ICD-10-CM

## 2022-11-01 NOTE — PROGRESS NOTES
Daily Note     Today's date: 2022  Patient name: Angeli Hernandes  : 1960  MRN: [id number]  Referring provider: Perfecto Barnard DO  Dx:   Encounter Diagnosis     ICD-10-CM    1  Acute bursitis of left shoulder  M75 52    2  Rotator cuff syndrome of right shoulder  M75  101                   Subjective: Patient reports shoulders hurt the most upon waking  Objective: See treatment diary below      Assessment: Angeli Hernandes continues with steady progress towards motion goals  Patient painfree ROM appears to be increased  he required moderate verbal cueing to complete exercises appropriate form and intensity with no tactile cues  Patient function should improve with continued treatments  Plan: Continue per plan of care  Precautions: DM, HTN     Daily Treatment Diary: 20 visit max per calendar year!!!!      Initial Evaluation Date: 22  Compliance 10/4 10/6 10/11 10/13 10/18 10/20 10/25 11/1     Visit Number 8 9 10 11 12 13 14 15     Re-Eval              [address] [facility] Capt[facility]                           Manuals 10/25 11/1       Joint work Inf capsule mob gr 3 Inf capsule mob gr 3       ST         flexibility Low level ER and IR stretch Low level ER and IR stretch                Neuro Re-Ed         tband ext                  bodyblade         Rhythmic stability                                    Ther Ex         UBE/pulleys 4'/4' UBE  3' pulleys 4'/4' UBE  3' pulleys       Stick flex -        Stick ER -        Table slide -        Sleeper stretch 6x10" 6x10"       olivier levator stretch         olivier upper trap stretch         Wall pushups         Supine scap retraction 2-way gtb x30 ea gtb x30 ea       isometric         tband rows/ extension bktb x30 bktb x30 ea       S/l er 2x10 3# 2x10 3#       S/l abd x20 and s/l flex x20 x20 and s/l flex x20       Prone rows 10# 2x10 ea 10# 3x10 ea       Prone ext         Prone horiz abd 2x10 ea 3x10 ea       Supine serratus punch 5# 2x10 ea 10# 2x10 ea Supine shoulder flex x20 ea x30 ea       Towel wall slides         bicep         tricep         Ther Activity                           Modalities                             Access Code: [email or web address]  URL: [email or web address]  Date: 09/07/2022  Prepared by: Markell Ramirez    Exercises  · Seated Shoulder Flexion Towel Slide at Table Top Full Range of Motion - 1 x daily - 7 x weekly - 3 sets - 10 reps  · Supine Shoulder Flexion Extension AAROM with Dowel - 1 x daily - 7 x weekly - 3 sets - 10 reps  · Seated Scapular Retraction - 1 x daily - 7 x weekly - 3 sets - 10 reps

## 2022-11-03 ENCOUNTER — APPOINTMENT (OUTPATIENT)
Dept: PHYSICAL THERAPY | Facility: CLINIC | Age: 62
End: 2022-11-03

## 2022-11-04 DIAGNOSIS — I10 ESSENTIAL HYPERTENSION, BENIGN: ICD-10-CM

## 2022-11-04 RX ORDER — AMLODIPINE BESYLATE 5 MG/1
TABLET ORAL
Qty: 90 TABLET | Refills: 3 | Status: SHIPPED | OUTPATIENT
Start: 2022-11-04

## 2022-11-08 ENCOUNTER — OFFICE VISIT (OUTPATIENT)
Dept: PHYSICAL THERAPY | Facility: CLINIC | Age: 62
End: 2022-11-08

## 2022-11-08 DIAGNOSIS — M75.101 ROTATOR CUFF SYNDROME OF RIGHT SHOULDER: ICD-10-CM

## 2022-11-08 DIAGNOSIS — M75.52 ACUTE BURSITIS OF LEFT SHOULDER: Primary | ICD-10-CM

## 2022-11-08 NOTE — PROGRESS NOTES
Daily Note     Today's date: 2022  Patient name: Fadi Trinidad  : 1960  MRN: [id number]  Referring provider: Valentine Taylor DO  Dx:   Encounter Diagnosis     ICD-10-CM    1  Acute bursitis of left shoulder  M75 52    2  Rotator cuff syndrome of right shoulder  M75  101                   Subjective: woke up on left shoulder and was sore and stiff  Also noted stiffness across upper back      Objective: good strength bicep/tricep and distal, C5/6 reflexes symmetrical      Assessment: motion still much better than when starting PT  Pt also notes less throbbing in R shoulder  Reviewed strength program for home  Added shoulder press - had improved motion with less clicking after press work  Spent time reviewing appropriate frequency of strength at home and pt had good understanding  Continued skilled PT warranted to progress strength and HEP      Plan: see above     Precautions: DM, HTN     Daily Treatment Diary: 20 visit max per calendar year!!!!      Initial Evaluation Date: 22  Compliance 10/4 10/6 10/11 10/13 10/18 10/20 10/25 11/1 11/8    Visit Number 8 9 10 11 12 13 14 15 16    Re-Eval              [facility] Captured                           Manuals 10/25 11/1 11/8      Joint work Inf capsule mob gr 3 Inf capsule mob gr 3       ST         flexibility Low level ER and IR stretch Low level ER and IR stretch                Neuro Re-Ed         tband ext                  bodyblade   Ir/er at side 3x10" ea      Rhythmic stability                                    Ther Ex         UBE/pulleys 4'/4' UBE  3' pulleys 4'/4' UBE  3' pulleys 4'/4' UBE  3' pulley      Stick flex -        Stick ER -        Table slide -        Sleeper stretch 6x10" 6x10"       olivier levator stretch         olivier upper trap stretch         Wall pushups         Supine scap retraction 2-way gtb x30 ea gtb x30 ea btb x25 ea      isometric         tband rows/ extension bktb x30 bktb x30 ea bktb high and mid x25 ea      tband ir/er gtb 2x20 ea      Shoulder press   2x8 R 0#, L 3# 3x8      S/l er 2x10 3# 2x10 3#       S/l abd x20 and s/l flex x20 x20 and s/l flex x20       Prone rows 10# 2x10 ea 10# 3x10 ea       Prone ext         Prone horiz abd 2x10 ea 3x10 ea       Supine serratus punch 5# 2x10 ea 10# 2x10 ea       Supine shoulder flex x20 ea x30 ea       Towel wall slides         bicep         tricep         education   reviewed HEP strength program      Ther Activity                           Modalities                             Access Code: [email or web address]  URL: [email or web address]  Date: 09/07/2022  Prepared by: Diane Greener    Exercises  · Seated Shoulder Flexion Towel Slide at Table Top Full Range of Motion - 1 x daily - 7 x weekly - 3 sets - 10 reps  · Supine Shoulder Flexion Extension AAROM with Dowel - 1 x daily - 7 x weekly - 3 sets - 10 reps  · Seated Scapular Retraction - 1 x daily - 7 x weekly - 3 sets - 10 reps      Access Code: [email or web address]  URL: [email or web address]  Date: 11/08/2022  Prepared by: Diane Greener    Exercises  · Sidelying Shoulder External Rotation - 1 x daily - 7 x weekly - 3 sets - 10 reps  · Sidelying Shoulder Abduction Palm Forward - 1 x daily - 7 x weekly - 3 sets - 10 reps  · Supine Shoulder Flexion Extension Full Range AROM - 1 x daily - 7 x weekly - 3 sets - 10 reps  · Supine Shoulder Horizontal Abduction with Resistance - 1 x daily - 7 x weekly - 3 sets - 10 reps  · Shoulder Overhead Press in Abduction with Dumbbells - 1 x daily - 7 x weekly - 3 sets - 10 reps

## 2022-11-10 ENCOUNTER — APPOINTMENT (OUTPATIENT)
Dept: PHYSICAL THERAPY | Facility: CLINIC | Age: 62
End: 2022-11-10

## 2022-11-14 DIAGNOSIS — I10 ESSENTIAL HYPERTENSION, BENIGN: ICD-10-CM

## 2022-11-14 RX ORDER — HYDROCHLOROTHIAZIDE 25 MG/1
TABLET ORAL
Qty: 90 TABLET | Refills: 3 | Status: SHIPPED | OUTPATIENT
Start: 2022-11-14

## 2022-11-15 ENCOUNTER — OFFICE VISIT (OUTPATIENT)
Dept: PHYSICAL THERAPY | Facility: CLINIC | Age: 62
End: 2022-11-15

## 2022-11-15 DIAGNOSIS — M75.101 ROTATOR CUFF SYNDROME OF RIGHT SHOULDER: ICD-10-CM

## 2022-11-15 DIAGNOSIS — M75.52 ACUTE BURSITIS OF LEFT SHOULDER: Primary | ICD-10-CM

## 2022-11-15 NOTE — PROGRESS NOTES
Daily Note     Today's date: 11/15/2022  Patient name: Ruth Gonzalez  : 1960  MRN: [id number]  Referring provider: Dominga Phan DO  Dx:   Encounter Diagnosis     ICD-10-CM    1  Acute bursitis of left shoulder  M75 52    2  Rotator cuff syndrome of right shoulder  M75  101                   Subjective: good week  Able to do press and felt it helped with consistent motion  Able to reach into cabinet even first thing in AM now  Still gets sore but not as bad  Sleeping remains most challenging - after 3-4 hours has to stand up and let arms hang a little  Objective: See treatment diary below      Assessment: good tolerance with therapy today  Able to increase weight on right with press to 2# (not albe to complete with any wt last week)  Greater ease with active motion (B) as a whole  Good response with therapy  Plan: will follow-up in 2 weeks with goal of DC to HEP at that time     Precautions: DM, HTN     Daily Treatment Diary: 20 visit max per calendar year!!!!      Initial Evaluation Date: 22  Compliance 10/4 10/6 10/11 10/13 10/18 10/20 10/25 11/1 11/8 11/15   Visit Number 8 9 10 11 12 13 14 15 16 17   Re-Eval                 Foto Captured                           Manuals 10/25 11/1 11/8 11/15     Joint work Inf capsule mob gr 3 Inf capsule mob gr 3       ST         flexibility Low level ER and IR stretch Low level ER and IR stretch                Neuro Re-Ed         tband ext                  bodyblade   Ir/er at side 3x10" ea Ir/er at side 3x10"     Rhythmic stability                                    Ther Ex         UBE/pulleys 4'/4' UBE  3' pulleys 4'/4' UBE  3' pulleys 4'/4' UBE  3' pulley 4'/4' UBE     Stick flex -        Stick ER -        Table slide -        Sleeper stretch 6x10" 6x10"       olivier levator stretch         olivier upper trap stretch         Wall pushups         Supine scap retraction 2-way gtb x30 ea gtb x30 ea btb x25 ea btb x25     isometric         tband rows/ extension bktb x30 bktb x30 ea bktb high and mid x25 ea bktb high/mid/low x30 ea     tband ir/er   gtb 2x20 ea gtb 2x20     Shoulder press   2x8 R 0#, L 3# 3x8 2# 2x8 (B)     S/l er 2x10 3# 2x10 3#       S/l abd x20 and s/l flex x20 x20 and s/l flex x20       Prone rows 10# 2x10 ea 10# 3x10 ea       Prone ext         Prone horiz abd 2x10 ea 3x10 ea  2x10 ea     Supine serratus punch 5# 2x10 ea 10# 2x10 ea  10# 2x10     Supine shoulder flex x20 ea x30 ea  0# x10 1# 2x6     Towel wall slides         bicep         tricep         education   reviewed HEP strength program      Ther Activity                           Modalities                             Access Code: [email or web address]  URL: [email or web address]  Date: 09/07/2022  Prepared by: Evelyn Ruizckleton    Exercises  · Seated Shoulder Flexion Towel Slide at Table Top Full Range of Motion - 1 x daily - 7 x weekly - 3 sets - 10 reps  · Supine Shoulder Flexion Extension AAROM with Dowel - 1 x daily - 7 x weekly - 3 sets - 10 reps  · Seated Scapular Retraction - 1 x daily - 7 x weekly - 3 sets - 10 reps      Access Code: [email or web address]  URL: [email or web address]  Date: 11/08/2022  Prepared by: Charley Shackleton    Exercises  · Sidelying Shoulder External Rotation - 1 x daily - 7 x weekly - 3 sets - 10 reps  · Sidelying Shoulder Abduction Palm Forward - 1 x daily - 7 x weekly - 3 sets - 10 reps  · Supine Shoulder Flexion Extension Full Range AROM - 1 x daily - 7 x weekly - 3 sets - 10 reps  · Supine Shoulder Horizontal Abduction with Resistance - 1 x daily - 7 x weekly - 3 sets - 10 reps  · Shoulder Overhead Press in Abduction with Dumbbells - 1 x daily - 7 x weekly - 3 sets - 10 reps

## 2022-11-17 ENCOUNTER — APPOINTMENT (OUTPATIENT)
Dept: PHYSICAL THERAPY | Facility: CLINIC | Age: 62
End: 2022-11-17

## 2022-11-22 ENCOUNTER — APPOINTMENT (OUTPATIENT)
Dept: PHYSICAL THERAPY | Facility: CLINIC | Age: 62
End: 2022-11-22

## 2022-11-29 ENCOUNTER — EVALUATION (OUTPATIENT)
Dept: PHYSICAL THERAPY | Facility: CLINIC | Age: 62
End: 2022-11-29

## 2022-11-29 DIAGNOSIS — M75.52 ACUTE BURSITIS OF LEFT SHOULDER: Primary | ICD-10-CM

## 2022-11-29 DIAGNOSIS — M75.101 ROTATOR CUFF SYNDROME OF RIGHT SHOULDER: ICD-10-CM

## 2022-11-29 NOTE — PROGRESS NOTES
Daily Note     Today's date: 2022  Patient name: Gerson You  : 1960  MRN: [id number]  Referring provider: Kala Ayala DO  Dx:   Encounter Diagnosis     ICD-10-CM    1  Acute bursitis of left shoulder  M75 52       2  Rotator cuff syndrome of right shoulder  M75  101                      Subjective: Patient reports shoulders got sore with taking out and moving in a new dryer  Before that he felt good  Objective: See treatment diary below      Assessment: Gerson You appeared to have decreased pain with increased repititions  He overall has made progress in PT  He has good understanding of HEP and should do well with D/C  Plan: D/C patient to HEP as per PT POC  Precautions: DM, HTN     Daily Treatment Diary: 20 visit max per calendar year!!!!      Initial Evaluation Date: 22  Compliance 11/29      10/25 11/1 11/8 11/15   Visit Number 18      14 15 16 17   Re-Eval                 Foto Captured                           Manuals 10/25 11/1 11/8 11/15 11/29    Joint work Inf capsule mob gr 3 Inf capsule mob gr 3       ST         flexibility Low level ER and IR stretch Low level ER and IR stretch                Neuro Re-Ed         tband ext                  bodyblade   Ir/er at side 3x10" ea Ir/er at side 3x10" Ir/er at side 3x10"    Rhythmic stability                                    Ther Ex         UBE/pulleys 4'/4' UBE  3' pulleys 4'/4' UBE  3' pulleys 4'/4' UBE  3' pulley 4'/4' UBE 4'/4' UBE    Stick flex -        Stick ER -        Table slide -        Sleeper stretch 6x10" 6x10"       olivier levator stretch         olivier upper trap stretch         Wall pushups         Supine scap retraction 2-way gtb x30 ea gtb x30 ea btb x25 ea btb x25 btb x25    decompression position with towel roll     5'    tband rows/ extension bktb x30 bktb x30 ea bktb high and mid x25 ea bktb high/mid/low x30 ea bktb high/mid/low x30 ea    tband ir/er   gtb 2x20 ea gtb 2x20 gtb 2x20    Shoulder press 2x8 R 0#, L 3# 3x8 2# 2x8 (B) 2# 2x8 (B)    S/l er 2x10 3# 2x10 3#       S/l abd x20 and s/l flex x20 x20 and s/l flex x20       Prone rows 10# 2x10 ea 10# 3x10 ea       Prone ext         Prone horiz abd 2x10 ea 3x10 ea  2x10 ea 2x10 ea    Supine serratus punch 5# 2x10 ea 10# 2x10 ea  10# 2x10 10# 2x10    Supine shoulder flex x20 ea x30 ea  0# x10 1# 2x6 1# 2x6    Towel wall slides         bicep         tricep         education   reviewed HEP strength program      Ther Activity                           Modalities                             Access Code: [email or web address]  URL: [email or web address]/  Date: 09/07/2022  Prepared by: Melinata Laming    Exercises  · Seated Shoulder Flexion Towel Slide at Table Top Full Range of Motion - 1 x daily - 7 x weekly - 3 sets - 10 reps  · Supine Shoulder Flexion Extension AAROM with Dowel - 1 x daily - 7 x weekly - 3 sets - 10 reps  · Seated Scapular Retraction - 1 x daily - 7 x weekly - 3 sets - 10 reps      Access Code: [email or web address]  URL: [email or web address]/  Date: 11/08/2022  Prepared by: Melinata Laming    Exercises  · Sidelying Shoulder External Rotation - 1 x daily - 7 x weekly - 3 sets - 10 reps  · Sidelying Shoulder Abduction Palm Forward - 1 x daily - 7 x weekly - 3 sets - 10 reps  · Supine Shoulder Flexion Extension Full Range AROM - 1 x daily - 7 x weekly - 3 sets - 10 reps  · Supine Shoulder Horizontal Abduction with Resistance - 1 x daily - 7 x weekly - 3 sets - 10 reps  · Shoulder Overhead Press in Abduction with Dumbbells - 1 x daily - 7 x weekly - 3 sets - 10 reps    Access Code: [email or web address]  URL: [email or web address]/  Date: 11/29/2022  Prepared by:  Isabel Mcnamara    Exercises  • Standing High Row with Resistance - 1 x daily - 4 x weekly - 3 sets - 10 reps  • Shoulder extension with resistance - Neutral - 1 x daily - 4 x weekly - 3 sets - 10 reps  • Standing Shoulder Row with Anchored Resistance - 1 x daily - 4 x weekly - 3 sets - 10 reps  • Shoulder Internal Rotation with Resistance - 1 x daily - 4 x weekly - 3 sets - 10 reps  • Shoulder External Rotation with Anchored Resistance - 1 x daily - 4 x weekly - 3 sets - 10 reps

## 2022-11-29 NOTE — PROGRESS NOTES
PT Discharge    Today's date: 2022  Patient name: Darnell Chin  : 1960  MRN: [id number]  Referring provider: Angela Ashley DO  Dx:   Encounter Diagnosis     ICD-10-CM    1  Acute bursitis of left shoulder  M75 52       2  Rotator cuff syndrome of right shoulder  M75  101             Assessment  Assessment details:   Pt has been consistent with his HEP  He has shown improvement with AROM overall  Strength of flex/abd and ER remain limited  No distal strength findings  He is independent with his HEP and feels he will continue with this until has follow-up with PCP  Ortho referral is option he is aware of as well  Skilled PT is no longer justified at this time as he has plateaued with therapy services  He is in agreement with discharge  Impairments: abnormal or restricted ROM, activity intolerance, impaired physical strength and pain with function  Functional limitations: shoulder motion above chest level, lifting, sleeping  Symptom irritability: moderateUnderstanding of Dx/Px/POC: good   Prognosis: fair    Goals  ST-4 weeks  Active motion to 140 deg R shoulder - met  Reduce pain with daily activity and sleeping by 50% - varies  Independent with HEP focused on mobility - met    LT-8 weeks - not met  Able to resist flex and abd for 4/5 or greater by DC - not met  Independent with RTC and scapular strength - met  Reduce pain by 75% with typical daily activity - varies  Minimal to no pain donning/doffing shirt     Plan  Plan details: discharged          Subjective Evaluation    History of Present Illness  Symptoms vary with weather and intensity of work   Does feel progress made with therapy and is independent with his home program       Pain  At best pain ratin  At worst pain ratin    Patient Goals  Patient goals for therapy: decreased pain, increased motion, increased strength, independence with ADLs/IADLs and return to sport/leisure activities          Objective     General Comments:      Shoulder Comments   Cervical screen  [facility]    Shoulder AROM R/L  Flex 140/140  Abd 95/100  ER WFL  BB L1/L1        Shoulder PROM R/L  Flex 150/150  Er 75/75  Ir 45/45      MMT R/L   Flex/abd: 3+/5  ER: 4-/5  IR: 5/5    Elbow and wrist strength 5/5  shld shrug: strong    No change with active ROM of shld with cervical distraction    DTRs: 2+ c5/6/7

## 2023-01-05 ENCOUNTER — VBI (OUTPATIENT)
Dept: ADMINISTRATIVE | Facility: OTHER | Age: 63
End: 2023-01-05

## 2023-01-13 ENCOUNTER — RA CDI HCC (OUTPATIENT)
Dept: OTHER | Facility: HOSPITAL | Age: 63
End: 2023-01-13

## 2023-01-20 ENCOUNTER — OFFICE VISIT (OUTPATIENT)
Dept: FAMILY MEDICINE CLINIC | Facility: CLINIC | Age: 63
End: 2023-01-20

## 2023-01-20 VITALS
BODY MASS INDEX: 34.03 KG/M2 | TEMPERATURE: 96 F | WEIGHT: 273.7 LBS | HEIGHT: 75 IN | DIASTOLIC BLOOD PRESSURE: 78 MMHG | OXYGEN SATURATION: 95 % | HEART RATE: 72 BPM | SYSTOLIC BLOOD PRESSURE: 128 MMHG

## 2023-01-20 DIAGNOSIS — E78.5 DYSLIPIDEMIA: ICD-10-CM

## 2023-01-20 DIAGNOSIS — E11.9 TYPE 2 DIABETES MELLITUS WITHOUT COMPLICATION, WITHOUT LONG-TERM CURRENT USE OF INSULIN (HCC): Primary | ICD-10-CM

## 2023-01-20 DIAGNOSIS — M75.101 ROTATOR CUFF SYNDROME OF RIGHT SHOULDER: ICD-10-CM

## 2023-01-20 DIAGNOSIS — I10 ESSENTIAL HYPERTENSION, BENIGN: ICD-10-CM

## 2023-01-20 DIAGNOSIS — Z12.11 COLON CANCER SCREENING: ICD-10-CM

## 2023-01-20 LAB
CREAT UR-MCNC: 94 MG/DL
MICROALBUMIN UR-MCNC: 6 MG/L (ref 0–20)
MICROALBUMIN/CREAT 24H UR: 6 MG/G CREATININE (ref 0–30)

## 2023-01-20 NOTE — PROGRESS NOTES
Name: Asha Corral      : 1960      MRN: [id number]  Encounter Provider: Noah Schaumann, DO  Encounter Date: 2023   Encounter department: [facility]     1  Type 2 diabetes mellitus without complication, without long-term current use of insulin ([facility])  Assessment & Plan:    Lab Results   Component Value Date    HGBA1C 5 9 (H) 2022   Patient has type 2 diabetes mellitus which has been under excellent control  I ordered a CMP and hemoglobin A1c as well as urine albumin to creatinine ratio  I asked the patient to get the blood work done fasting and I will call him with the results  We discussed routine diabetic foot care  I also advised the patient to schedule an appointment to see his ophthalmologist for dilated eye exam     Orders:  -     Urine Microalbumin/creatinine ratio; Future  -     Urine Microalbumin/creatinine ratio    2  Colon cancer screening  -     Ambulatory referral for colonoscopy; Future    3  Essential hypertension, benign  Assessment & Plan:  Patient has hypertension which is well controlled  I checked his blood pressure myself and found his blood pressure to be 124/76  He will continue amlodipine 5 mg daily and lisinopril 40 mg daily      4  Dyslipidemia  Assessment & Plan:  Patient will continue rosuvastatin 5 mg daily      5  Rotator cuff syndrome of right shoulder  Assessment & Plan:  Patient is still having some problems with the shoulder although he is pleased with his progress  I did recommend he contact his orthopedic surgeon and schedule a follow-up visit  The patient does tell me he limits his activity for fear of aggravating his shoulder  Subjective      This is a 55-year-old white male who presents to the office today for his routine checkup  The patient is doing well and has no complaints  Patient did see orthopedics  He had therapy and is currently doing a home exercise program for his shoulder    He says his shoulder is much improved, although it still not normal   Per, he is pleased with his progress  The patient tells me he is checking his blood sugars regularly  He rides an exercise bike  He stays active  Review of Systems   Constitutional: Negative for activity change and appetite change  Respiratory: Negative for cough and shortness of breath  Cardiovascular: Negative for chest pain, palpitations and leg swelling  Gastrointestinal: Negative for abdominal distention, abdominal pain, blood in stool, constipation, diarrhea and nausea  Genitourinary:        Denies nocturia and weak urinary stream       Current Outpatient Medications on File Prior to Visit   Medication Sig   • amLODIPine (NORVASC) 5 mg tablet TAKE 1 TABLET DAILY   • Blood Glucose Monitoring Suppl (OneTouch Verio Reflect) w/Device KIT Check blood sugars once daily  Please substitute with appropriate alternative as covered by patient's insurance  Dx: E11 65   • celecoxib (CeleBREX) 100 mg capsule Take 1 capsule (100 mg total) by mouth 2 (two) times a day   • fluticasone (FLONASE) 50 mcg/act nasal spray USE 2 SPRAYS IN EACH NOSTRIL DAILY   • glucose blood (OneTouch Verio) test strip Check blood sugars once daily  Please substitute with appropriate alternative as covered by patient's insurance  Dx: E11 65   • hydrochlorothiazide (HYDRODIURIL) 25 mg tablet TAKE 1 TABLET DAILY   • lisinopril (ZESTRIL) 40 mg tablet TAKE 1 TABLET DAILY   • metFORMIN (GLUCOPHAGE) 500 mg tablet Take 1 tablet (500 mg total) by mouth 2 (two) times a day with meals   • omeprazole (PriLOSEC OTC) 20 MG tablet Take 20 mg by mouth daily   • OneTouch Delica Lancets 39F MISC Check blood sugars once daily  Please substitute with appropriate alternative as covered by patient's insurance   Dx: E11 65   • rosuvastatin (CRESTOR) 5 mg tablet Take 1 tablet (5 mg total) by mouth daily   • sildenafil (VIAGRA) 100 mg tablet Take 1 tablet (100 mg total) by mouth daily as needed for erectile dysfunction       Objective     /78   Pulse 72   Temp (!) 96 °F (35 6 °C) (Tympanic)   Ht 6' 3" (1 905 m)   Wt 124 kg (273 lb 11 2 oz)   SpO2 95%   BMI 34 21 kg/m²     Physical Exam  Vitals reviewed  Constitutional:       Comments: Patient is a 60-year-old white male who appears his stated age  The patient is pleasant, cooperative, and in no distress   HENT:      Head: Normocephalic and atraumatic  Right Ear: Tympanic membrane, ear canal and external ear normal  There is no impacted cerumen  Left Ear: Tympanic membrane, ear canal and external ear normal  There is no impacted cerumen  Mouth/Throat:      Mouth: Mucous membranes are moist       Pharynx: Oropharynx is clear  No oropharyngeal exudate or posterior oropharyngeal erythema  Eyes:      General: No scleral icterus  Right eye: No discharge  Left eye: No discharge  Conjunctiva/sclera: Conjunctivae normal       Pupils: Pupils are equal, round, and reactive to light  Cardiovascular:      Rate and Rhythm: Normal rate and regular rhythm  Heart sounds: Normal heart sounds  No murmur heard  No friction rub  No gallop  Pulmonary:      Effort: Pulmonary effort is normal  No respiratory distress  Breath sounds: Normal breath sounds  No stridor  No wheezing, rhonchi or rales  Abdominal:      General: Bowel sounds are normal  There is no distension  Palpations: Abdomen is soft  There is no mass  Tenderness: There is no abdominal tenderness  There is no guarding  Musculoskeletal:      Cervical back: Neck supple  Lymphadenopathy:      Cervical: No cervical adenopathy  Psychiatric:         Mood and Affect: Mood normal          Behavior: Behavior normal          Thought Content:  Thought content normal          Judgment: Judgment normal        Extremities: Without cyanosis, clubbing, or edema  Karole Many, DO

## 2023-02-01 NOTE — ASSESSMENT & PLAN NOTE
Lab Results   Component Value Date    HGBA1C 5 9 (H) 09/16/2022   Patient has type 2 diabetes mellitus which has been under excellent control  I ordered a CMP and hemoglobin A1c as well as urine albumin to creatinine ratio  I asked the patient to get the blood work done fasting and I will call him with the results  We discussed routine diabetic foot care    I also advised the patient to schedule an appointment to see his ophthalmologist for dilated eye exam

## 2023-02-01 NOTE — ASSESSMENT & PLAN NOTE
Patient has hypertension which is well controlled  I checked his blood pressure myself and found his blood pressure to be 124/76    He will continue amlodipine 5 mg daily and lisinopril 40 mg daily

## 2023-02-01 NOTE — ASSESSMENT & PLAN NOTE
Patient is still having some problems with the shoulder although he is pleased with his progress  I did recommend he contact his orthopedic surgeon and schedule a follow-up visit  The patient does tell me he limits his activity for fear of aggravating his shoulder

## 2023-04-04 ENCOUNTER — TELEPHONE (OUTPATIENT)
Dept: DIABETES SERVICES | Facility: CLINIC | Age: 63
End: 2023-04-04

## 2023-05-15 DIAGNOSIS — E11.9 TYPE 2 DIABETES MELLITUS WITHOUT COMPLICATION, WITHOUT LONG-TERM CURRENT USE OF INSULIN (HCC): ICD-10-CM

## 2023-05-15 DIAGNOSIS — E78.5 DYSLIPIDEMIA: ICD-10-CM

## 2023-05-15 RX ORDER — BLOOD SUGAR DIAGNOSTIC
STRIP MISCELLANEOUS
Qty: 100 STRIP | Refills: 3 | Status: SHIPPED | OUTPATIENT
Start: 2023-05-15

## 2023-05-15 RX ORDER — ROSUVASTATIN CALCIUM 5 MG/1
TABLET, COATED ORAL
Qty: 90 TABLET | Refills: 3 | Status: SHIPPED | OUTPATIENT
Start: 2023-05-15

## 2023-05-15 RX ORDER — LANCETS 33 GAUGE
EACH MISCELLANEOUS
Qty: 100 EACH | Refills: 3 | Status: SHIPPED | OUTPATIENT
Start: 2023-05-15

## 2023-05-26 ENCOUNTER — OFFICE VISIT (OUTPATIENT)
Dept: FAMILY MEDICINE CLINIC | Facility: CLINIC | Age: 63
End: 2023-05-26

## 2023-05-26 VITALS
TEMPERATURE: 97.9 F | SYSTOLIC BLOOD PRESSURE: 127 MMHG | WEIGHT: 277.9 LBS | OXYGEN SATURATION: 95 % | HEIGHT: 75 IN | HEART RATE: 71 BPM | BODY MASS INDEX: 34.55 KG/M2 | DIASTOLIC BLOOD PRESSURE: 66 MMHG

## 2023-05-26 DIAGNOSIS — G47.33 OBSTRUCTIVE SLEEP APNEA: ICD-10-CM

## 2023-05-26 DIAGNOSIS — Z12.5 PROSTATE CANCER SCREENING: ICD-10-CM

## 2023-05-26 DIAGNOSIS — I10 ESSENTIAL HYPERTENSION, BENIGN: ICD-10-CM

## 2023-05-26 DIAGNOSIS — Z12.11 COLON CANCER SCREENING: ICD-10-CM

## 2023-05-26 DIAGNOSIS — E11.9 TYPE 2 DIABETES MELLITUS WITHOUT COMPLICATION, WITHOUT LONG-TERM CURRENT USE OF INSULIN (HCC): Primary | ICD-10-CM

## 2023-05-26 DIAGNOSIS — E78.5 DYSLIPIDEMIA: ICD-10-CM

## 2023-05-26 NOTE — ASSESSMENT & PLAN NOTE
We were discussing getting a screening colonoscopy, the patient expressed his fears of anesthesia  He received anesthesia several times in the past   Each time, they had a very difficult time waking him after anesthesia  He tells me after his gallbladder surgery, he was to be discharged the same day  He did not wake up until 4 AM the following morning  I suspect he has sleep apnea  We discussed this  He admits to loud snoring  He tells me his girlfriend thinks he breathes funny and sleep at night  He has awakened himself from sleep at night on several occasions as well  We discussed testing for but the patient feels well and declines  He wants to try to lose more weight  His goal is to get down to 250 pounds

## 2023-05-26 NOTE — PROGRESS NOTES
Name: Araceli Abreu      : 1960      MRN: [id number]  Encounter Provider: Alix Olivera DO  Encounter Date: 2023   Encounter department: [facility]    Assessment & Plan     1  Type 2 diabetes mellitus without complication, without long-term current use of insulin [facility])  Assessment & Plan:    Lab Results   Component Value Date    HGBA1C 5 9 (H) 2022     Patient has type 2 diabetes mellitus  I reviewed his Accu-Cheks  For the most part, his Accu-Cheks are very good  I note that the patient never got his blood work that I ordered from September  I printed him off a new slip so that he can get it done as he lost the slip  He had a PSA to the labs  I do recommend he get into see ophthalmology for dilated eye exam   We discussed routine diabetic foot care  The patient will continue metformin 500 mg twice a day  2  Prostate cancer screening  -     PSA, Total Screen; Future    3  Colon cancer screening  -     Cologuard    4  Essential hypertension, benign  Assessment & Plan:  Patient has hypertension  Blood pressure is well controlled on his current regiment  Patient does check his blood pressures at home and has found them to be controlled  I will have him continue lisinopril 40 mg daily, amlodipine 5 mg daily, and hydrochlorothiazide 25 mg daily  Needs to get the blood work so that I can assess his potassium and creatinine  5  Dyslipidemia  Assessment & Plan:  Lipid panel was previously ordered and pending  The patient will continue rosuvastatin 5 mg daily  Goal LDL cholesterol is less than 70       6  Obstructive sleep apnea  Assessment & Plan: We were discussing getting a screening colonoscopy, the patient expressed his fears of anesthesia  He received anesthesia several times in the past   Each time, they had a very difficult time waking him after anesthesia  He tells me after his gallbladder surgery, he was to be discharged the same day    He did not wake up until 4 AM the following morning  I suspect he has sleep apnea  We discussed this  He admits to loud snoring  He tells me his girlfriend thinks he breathes funny and sleep at night  He has awakened himself from sleep at night on several occasions as well  We discussed testing for but the patient feels well and declines  He wants to try to lose more weight  His goal is to get down to 250 pounds  Subjective     This is a 12-year-old white male presents to the office today for his routine checkup  The patient is doing well and has no complaints  He gained 4 pounds since his last visit  He tells me he went off his diet for a bit  He wanted to see what would happen  He tells me his blood sugars went up as well as his weight  He is back on his diet now  He tells me his goal is to get down to 250 pounds  He still exercising  He tells me his shoulder is gradually getting better  He is able to play golf without any difficulty now  He tells me that he is considering changing doctors  It is over an hour drive for him to get here now from where he is living  Review of Systems   Constitutional: Negative for fatigue  HENT: Positive for hearing loss and tinnitus  Respiratory:        Positive for loud snoring   Cardiovascular: Negative for chest pain, palpitations and leg swelling  Gastrointestinal: Negative for abdominal distention, abdominal pain, blood in stool, constipation, diarrhea and nausea  Genitourinary:        Positive for dribbling   Denies weak urinary stream, nocturia       Past Medical History:   Diagnosis Date   • GERD without esophagitis     last assessed: 6/16/2017   • Hypertension     last assessed: 6/22/2018   • Lumbar degenerative disc disease     last assessed: 4/4/2014   • Palpitations     last assessed: 12/16/2016     Past Surgical History:   Procedure Laterality Date   • BACK SURGERY     • CHOLECYSTECTOMY     • KNEE SURGERY       Family History   Problem Relation Age of Onset   • Diabetes Mother    • Hypertension Father      Social History     Socioeconomic History   • Marital status:      Spouse name: None   • Number of children: None   • Years of education: None   • Highest education level: None   Occupational History   • None   Tobacco Use   • Smoking status: Former     Packs/day: 1 00     Years: 36 00     Total pack years: 36 00     Types: Cigarettes     Start date: 1     Quit date:      Years since quittin 4   • Smokeless tobacco: Never   Vaping Use   • Vaping Use: Never used   Substance and Sexual Activity   • Alcohol use: Not Currently   • Drug use: Never   • Sexual activity: Not Currently   Other Topics Concern   • None   Social History Narrative   • None     Social Determinants of Health     Financial Resource Strain: Not on file   Food Insecurity: Not on file   Transportation Needs: Not on file   Physical Activity: Not on file   Stress: Not on file   Social Connections: Not on file   Intimate Partner Violence: Not on file   Housing Stability: Not on file     Current Outpatient Medications on File Prior to Visit   Medication Sig   • amLODIPine (NORVASC) 5 mg tablet TAKE 1 TABLET DAILY   • Blood Glucose Monitoring Suppl (OneTouch Verio Reflect) w/Device KIT Check blood sugars once daily  Please substitute with appropriate alternative as covered by patient's insurance   Dx: E11 65   • celecoxib (CeleBREX) 100 mg capsule Take 1 capsule (100 mg total) by mouth 2 (two) times a day   • fluticasone (FLONASE) 50 mcg/act nasal spray USE 2 SPRAYS IN EACH NOSTRIL DAILY   • glucose blood (OneTouch Verio) test strip CHECK BLOOD SUGARS ONCE DAILY   • hydrochlorothiazide (HYDRODIURIL) 25 mg tablet TAKE 1 TABLET DAILY   • Lancets (OneTouch Delica Plus [id number]) MISC CHECK BLOOD SUGARS ONCE DAILY   • lisinopril (ZESTRIL) 40 mg tablet TAKE 1 TABLET DAILY   • metFORMIN (GLUCOPHAGE) 500 mg tablet TAKE 1 TABLET TWICE A DAY WITH MEALS   • omeprazole (PriLOSEC "OTC) 20 MG tablet Take 20 mg by mouth daily   • rosuvastatin (CRESTOR) 5 mg tablet TAKE 1 TABLET DAILY   • sildenafil (VIAGRA) 100 mg tablet Take 1 tablet (100 mg total) by mouth daily as needed for erectile dysfunction     Allergies   Allergen Reactions   • Aspirin Nausea Only     Immunization History   Administered Date(s) Administered   • COVID-19 MODERNA VACC 0 5 ML IM 04/13/2021, 05/11/2021, 01/24/2022   • INFLUENZA 10/03/2020   • Influenza, injectable, quadrivalent, preservative free 0 5 mL 10/03/2020   • Influenza, recombinant, quadrivalent,injectable, preservative free 12/10/2021, 09/23/2022   • Td (adult), adsorbed 03/23/2005       Objective     /66   Pulse 71   Temp 97 9 °F (36 6 °C) (Temporal)   Ht 6' 3\" (1 905 m)   Wt 126 kg (277 lb 14 4 oz)   SpO2 95%   BMI 34 74 kg/m²     Physical Exam  Vitals reviewed  Constitutional:       Comments: This is a 58year old white male who appears his stated age  He is pleasant, cooperative, and in no distress  HENT:      Head: Normocephalic and atraumatic  Right Ear: Tympanic membrane, ear canal and external ear normal  There is no impacted cerumen  Left Ear: Tympanic membrane, ear canal and external ear normal  There is no impacted cerumen  Mouth/Throat:      Mouth: Mucous membranes are moist       Pharynx: Oropharynx is clear  No oropharyngeal exudate or posterior oropharyngeal erythema  Eyes:      General: No scleral icterus  Right eye: No discharge  Left eye: No discharge  Conjunctiva/sclera: Conjunctivae normal       Pupils: Pupils are equal, round, and reactive to light  Neck:      Comments: There is no thyromegaly  Cardiovascular:      Rate and Rhythm: Normal rate and regular rhythm  Pulses: no weak pulses          Dorsalis pedis pulses are 2+ on the right side and 2+ on the left side  Posterior tibial pulses are 2+ on the right side and 2+ on the left side  Heart sounds: Normal heart sounds   " No murmur heard  No friction rub  No gallop  Pulmonary:      Effort: Pulmonary effort is normal  No respiratory distress  Breath sounds: Normal breath sounds  No stridor  No wheezing, rhonchi or rales  Abdominal:      General: Bowel sounds are normal  There is no distension  Palpations: Abdomen is soft  There is no mass  Tenderness: There is no abdominal tenderness  There is no guarding  Comments: No organomegaly   Musculoskeletal:      Cervical back: Neck supple  Right lower leg: No edema  Left lower leg: No edema  Feet:      Right foot:      Skin integrity: No ulcer, skin breakdown, erythema, warmth, callus or dry skin  Left foot:      Skin integrity: No ulcer, skin breakdown, erythema, warmth, callus or dry skin  Lymphadenopathy:      Cervical: No cervical adenopathy  Psychiatric:         Mood and Affect: Mood normal          Behavior: Behavior normal          Thought Content: Thought content normal          Judgment: Judgment normal         Patient's shoes and socks removed  Right Foot/Ankle   Right Foot Inspection  Skin Exam: skin normal and skin intact  No dry skin, no warmth, no callus, no erythema, no maceration, no abnormal color, no pre-ulcer, no ulcer and no callus  Toe Exam: No swelling, no tenderness, erythema and  no right toe deformity    Sensory   Vibration: diminished  Proprioception: intact  Monofilament testing: diminished    Vascular  Capillary refills: < 3 seconds  The right DP pulse is 2+  The right PT pulse is 2+  Left Foot/Ankle  Left Foot Inspection  Skin Exam: skin normal and skin intact  No dry skin, no warmth, no erythema, no maceration, normal color, no pre-ulcer, no ulcer and no callus  Toe Exam: No swelling, no tenderness, no erythema and no left toe deformity  Sensory   Vibration: diminished  Proprioception: intact  Monofilament testing: diminished    Vascular  Capillary refills: < 3 seconds  The left DP pulse is 2+  The left PT pulse is 2+       Assign Risk Category  No deformity present  Loss of protective sensation  No weak pulses  Risk: 1      Zari Ny DO

## 2023-05-26 NOTE — ASSESSMENT & PLAN NOTE
Lab Results   Component Value Date    HGBA1C 5 9 (H) 09/16/2022     Patient has type 2 diabetes mellitus  I reviewed his Accu-Cheks  For the most part, his Accu-Cheks are very good  I note that the patient never got his blood work that I ordered from September  I printed him off a new slip so that he can get it done as he lost the slip  He had a PSA to the labs  I do recommend he get into see ophthalmology for dilated eye exam   We discussed routine diabetic foot care  The patient will continue metformin 500 mg twice a day

## 2023-05-26 NOTE — ASSESSMENT & PLAN NOTE
Lipid panel was previously ordered and pending  The patient will continue rosuvastatin 5 mg daily  Goal LDL cholesterol is less than 70

## 2023-05-26 NOTE — ASSESSMENT & PLAN NOTE
Patient has hypertension  Blood pressure is well controlled on his current regiment  Patient does check his blood pressures at home and has found them to be controlled  I will have him continue lisinopril 40 mg daily, amlodipine 5 mg daily, and hydrochlorothiazide 25 mg daily  Needs to get the blood work so that I can assess his potassium and creatinine

## 2023-08-01 DIAGNOSIS — J30.1 SEASONAL ALLERGIC RHINITIS DUE TO POLLEN: ICD-10-CM

## 2023-08-01 RX ORDER — FLUTICASONE PROPIONATE 50 MCG
SPRAY, SUSPENSION (ML) NASAL
Qty: 48 G | Refills: 3 | Status: SHIPPED | OUTPATIENT
Start: 2023-08-01

## 2023-09-29 ENCOUNTER — OFFICE VISIT (OUTPATIENT)
Dept: FAMILY MEDICINE CLINIC | Facility: CLINIC | Age: 63
End: 2023-09-29
Payer: COMMERCIAL

## 2023-09-29 ENCOUNTER — APPOINTMENT (OUTPATIENT)
Dept: LAB | Facility: CLINIC | Age: 63
End: 2023-09-29
Payer: COMMERCIAL

## 2023-09-29 DIAGNOSIS — Z23 ENCOUNTER FOR IMMUNIZATION: ICD-10-CM

## 2023-09-29 DIAGNOSIS — E11.9 TYPE 2 DIABETES MELLITUS WITHOUT COMPLICATION, WITHOUT LONG-TERM CURRENT USE OF INSULIN (HCC): ICD-10-CM

## 2023-09-29 DIAGNOSIS — E78.5 DYSLIPIDEMIA: ICD-10-CM

## 2023-09-29 DIAGNOSIS — I10 ESSENTIAL HYPERTENSION, BENIGN: ICD-10-CM

## 2023-09-29 DIAGNOSIS — R53.83 OTHER FATIGUE: ICD-10-CM

## 2023-09-29 DIAGNOSIS — Z11.59 ENCOUNTER FOR HEPATITIS C SCREENING TEST FOR LOW RISK PATIENT: ICD-10-CM

## 2023-09-29 DIAGNOSIS — E11.9 TYPE 2 DIABETES MELLITUS WITHOUT COMPLICATION, WITHOUT LONG-TERM CURRENT USE OF INSULIN (HCC): Primary | ICD-10-CM

## 2023-09-29 DIAGNOSIS — Z12.5 PROSTATE CANCER SCREENING: ICD-10-CM

## 2023-09-29 DIAGNOSIS — M75.101 ROTATOR CUFF SYNDROME OF RIGHT SHOULDER: ICD-10-CM

## 2023-09-29 LAB
ALBUMIN SERPL BCP-MCNC: 4.8 G/DL (ref 3.5–5)
ALP SERPL-CCNC: 47 U/L (ref 34–104)
ALT SERPL W P-5'-P-CCNC: 26 U/L (ref 7–52)
ANION GAP SERPL CALCULATED.3IONS-SCNC: 8 MMOL/L
AST SERPL W P-5'-P-CCNC: 39 U/L (ref 13–39)
BASOPHILS # BLD AUTO: 0.04 THOUSANDS/ÂΜL (ref 0–0.1)
BASOPHILS NFR BLD AUTO: 1 % (ref 0–1)
BILIRUB SERPL-MCNC: 1.72 MG/DL (ref 0.2–1)
BUN SERPL-MCNC: 16 MG/DL (ref 5–25)
CALCIUM SERPL-MCNC: 10.6 MG/DL (ref 8.4–10.2)
CHLORIDE SERPL-SCNC: 101 MMOL/L (ref 96–108)
CHOLEST SERPL-MCNC: 121 MG/DL
CO2 SERPL-SCNC: 28 MMOL/L (ref 21–32)
CREAT SERPL-MCNC: 0.9 MG/DL (ref 0.6–1.3)
CREAT UR-MCNC: 72 MG/DL
EOSINOPHIL # BLD AUTO: 0.16 THOUSAND/ÂΜL (ref 0–0.61)
EOSINOPHIL NFR BLD AUTO: 3 % (ref 0–6)
ERYTHROCYTE [DISTWIDTH] IN BLOOD BY AUTOMATED COUNT: 12.3 % (ref 11.6–15.1)
EST. AVERAGE GLUCOSE BLD GHB EST-MCNC: 117 MG/DL
GFR SERPL CREATININE-BSD FRML MDRD: 90 ML/MIN/1.73SQ M
GLUCOSE P FAST SERPL-MCNC: 121 MG/DL (ref 65–99)
HBA1C MFR BLD: 5.7 %
HCT VFR BLD AUTO: 46.9 % (ref 36.5–49.3)
HCV AB SER QL: NORMAL
HDLC SERPL-MCNC: 36 MG/DL
HGB BLD-MCNC: 15.9 G/DL (ref 12–17)
IMM GRANULOCYTES # BLD AUTO: 0.02 THOUSAND/UL (ref 0–0.2)
IMM GRANULOCYTES NFR BLD AUTO: 0 % (ref 0–2)
LDLC SERPL CALC-MCNC: 67 MG/DL (ref 0–100)
LYMPHOCYTES # BLD AUTO: 1.1 THOUSANDS/ÂΜL (ref 0.6–4.47)
LYMPHOCYTES NFR BLD AUTO: 18 % (ref 14–44)
MCH RBC QN AUTO: 31.1 PG (ref 26.8–34.3)
MCHC RBC AUTO-ENTMCNC: 33.9 G/DL (ref 31.4–37.4)
MCV RBC AUTO: 92 FL (ref 82–98)
MICROALBUMIN UR-MCNC: 9.1 MG/L
MICROALBUMIN/CREAT 24H UR: 13 MG/G CREATININE (ref 0–30)
MONOCYTES # BLD AUTO: 0.38 THOUSAND/ÂΜL (ref 0.17–1.22)
MONOCYTES NFR BLD AUTO: 6 % (ref 4–12)
NEUTROPHILS # BLD AUTO: 4.49 THOUSANDS/ÂΜL (ref 1.85–7.62)
NEUTS SEG NFR BLD AUTO: 72 % (ref 43–75)
NRBC BLD AUTO-RTO: 0 /100 WBCS
PLATELET # BLD AUTO: 176 THOUSANDS/UL (ref 149–390)
PMV BLD AUTO: 11.4 FL (ref 8.9–12.7)
POTASSIUM SERPL-SCNC: 3.7 MMOL/L (ref 3.5–5.3)
PROT SERPL-MCNC: 7.9 G/DL (ref 6.4–8.4)
PSA SERPL-MCNC: 2.48 NG/ML (ref 0–4)
RBC # BLD AUTO: 5.11 MILLION/UL (ref 3.88–5.62)
SODIUM SERPL-SCNC: 137 MMOL/L (ref 135–147)
TRIGL SERPL-MCNC: 88 MG/DL
TSH SERPL DL<=0.05 MIU/L-ACNC: 1.94 UIU/ML (ref 0.45–4.5)
WBC # BLD AUTO: 6.19 THOUSAND/UL (ref 4.31–10.16)

## 2023-09-29 PROCEDURE — 82043 UR ALBUMIN QUANTITATIVE: CPT | Performed by: FAMILY MEDICINE

## 2023-09-29 PROCEDURE — 80061 LIPID PANEL: CPT

## 2023-09-29 PROCEDURE — 80053 COMPREHEN METABOLIC PANEL: CPT

## 2023-09-29 PROCEDURE — 85025 COMPLETE CBC W/AUTO DIFF WBC: CPT

## 2023-09-29 PROCEDURE — 99214 OFFICE O/P EST MOD 30 MIN: CPT | Performed by: FAMILY MEDICINE

## 2023-09-29 PROCEDURE — 82570 ASSAY OF URINE CREATININE: CPT | Performed by: FAMILY MEDICINE

## 2023-09-29 PROCEDURE — 84443 ASSAY THYROID STIM HORMONE: CPT

## 2023-09-29 PROCEDURE — 90471 IMMUNIZATION ADMIN: CPT

## 2023-09-29 PROCEDURE — G0103 PSA SCREENING: HCPCS

## 2023-09-29 PROCEDURE — 83036 HEMOGLOBIN GLYCOSYLATED A1C: CPT

## 2023-09-29 PROCEDURE — 86803 HEPATITIS C AB TEST: CPT

## 2023-09-29 PROCEDURE — 36415 COLL VENOUS BLD VENIPUNCTURE: CPT

## 2023-09-29 PROCEDURE — 90686 IIV4 VACC NO PRSV 0.5 ML IM: CPT

## 2023-09-29 RX ORDER — CELECOXIB 100 MG/1
100 CAPSULE ORAL 2 TIMES DAILY PRN
Qty: 40 CAPSULE | Refills: 0
Start: 2023-09-29

## 2023-09-29 NOTE — PATIENT INSTRUCTIONS
Foot Care for People with Diabetes   AMBULATORY CARE:   What you need to know about foot care:  Long-term high blood sugar levels can damage the blood vessels and nerves in your legs and feet. This damage makes it hard to feel pressure, pain, temperature, and touch. You may not be able to feel a cut or sore, or shoes that are too tight. Foot care is needed to prevent serious problems, such as an infection or amputation. Diabetes may cause your toes to become crooked or curved under. These changes may affect the way you walk and can lead to increased pressure on your foot. The pressure can decrease blood flow to your feet. Lack of blood flow increases your risk for a foot ulcer. Call your care team provider if:   • Your feet become numb, weak, or hard to move. • You have pus draining from a sore on your foot. • You have a wound on your foot that gets bigger, deeper, or does not heal.    • You see blisters, cuts, scratches, calluses, or sores on your foot. • You have a fever, and your feet become red, warm, and swollen. • Your toenails become thick, curled, or yellow. • You find it hard to check your feet because your vision is poor. • You have questions or concerns about your condition or care. How to care for your feet:   • Check your feet each day. Look at your whole foot, including the bottom, and between and under your toes. Check for wounds, corns, and calluses. Use a mirror to see the bottom of your feet. The skin on your feet may be shiny, tight, or darker than normal. Your feet may also be cold and pale. Feel your feet by running your hands along the tops, bottoms, sides, and between your toes. Redness, swelling, and warmth are signs of blood flow problems that can lead to a foot ulcer. Do not try to remove corns or calluses yourself. Do not ignore small problems, such as dry skin or small wounds. These can become life-threatening over time without proper care.          • Wash your feet each day with soap and warm water. Do not use hot water, because this can injure your foot. Dry your feet gently with a towel after you wash them. Dry between and under your toes. • Apply lotion or a moisturizer on your dry feet. Ask your care team provider what lotions are best to use. Do not put lotion or moisturizer between your toes. Moisture between your toes could lead to skin breakdown. • Cut your toenails correctly. File or cut your toenails straight across. Use a soft brush to clean around your toenails. If your toenails are very thick, you may need to have a care team provider or specialist cut them. • Protect your feet. Do not walk barefoot or wear your shoes without socks. Check your shoes for rocks or other objects that can hurt your feet. Wear cotton socks to help keep your feet dry. Wear socks without toe seams, or wear them with the seams inside out. Change your socks each day. Do not wear socks that are dirty or damp. • Wear shoes that fit well. Wear shoes that do not rub against any area of your feet. Your shoes should be ½ to ¾ inch (1 to 2 centimeters) longer than your feet. Your shoes should also have extra space around the widest part of your feet. Walking or athletic shoes with laces or straps that adjust are best. Ask your care team provider for help to choose shoes that fit you best. Ask your provider if you need to wear an insert, orthotic, or bandage on your feet. • Go to your follow-up visits. Your care team provider will do a foot exam at least 1 time each year. You may need a foot exam more often if you have nerve damage, foot deformities, or ulcers. Your provider will check for nerve damage and how well you can feel your feet. Your provider will check your shoes to see if they fit well. • Do not smoke. Smoking can damage your blood vessels and put you at increased risk for foot ulcers.  Ask your care team provider for information if you currently smoke and need help to quit. E-cigarettes or smokeless tobacco still contain nicotine. Talk to your care team provider before you use these products. Follow up with your diabetes care team provider or foot specialist as directed: You will need to have your feet checked at least 1 time each year. You may need a foot exam more often if you have nerve damage, foot deformities, or ulcers. Write down your questions so you remember to ask them during your visits. © Copyright [employer] 2023 Information is for End User's use only and may not be sold, redistributed or otherwise used for commercial purposes. The above information is an  only. It is not intended as medical advice for individual conditions or treatments. Talk to your doctor, nurse or pharmacist before following any medical regimen to see if it is safe and effective for you.

## 2023-09-29 NOTE — PROGRESS NOTES
Name: Arnold Garcia      : 1960      MRN: [id number]  Encounter Provider: Gopi Johnson DO  Encounter Date: 2023   Encounter department: [address] [place] [address]     1. Type 2 diabetes mellitus without complication, without long-term current use of insulin [facility])  Assessment & Plan:    Lab Results   Component Value Date    HGBA1C 5.7 (H) 2023     Patient is a 60-year-old white male with type 2 diabetes mellitus. He has not had labs done for quite a while. He no longer lives in the area. He was going to change doctors but never got around to it. He fasted today for blood work. He intended to go for his fasting blood work immediately after leaving the office. I ordered a CMP and hemoglobin A1c. I also ordered a urine albumin to creatinine ratio. Obviously, I did not have these results when he was in the office. However, by the time I dictated his office note his labs were available. His diabetes is under excellent control, as I anticipated based upon his Accu-Cheks. I encouraged the patient to continue to walk for exercise. He will continue metformin. We again discussed routine diabetic foot care. I also asked the patient to schedule an appointment to see an optometrist or an ophthalmologist for dilated eye exam.    Orders:  -     Comprehensive metabolic panel; Future; Expected date: 2023  -     Hemoglobin A1C; Future; Expected date: 2023  -     Albumin / creatinine urine ratio; Future  -     Albumin / creatinine urine ratio    2. Rotator cuff syndrome of right shoulder  Assessment & Plan:  Patient is doing much better with regards to his right shoulder. However, he did request a refill for Celebrex to take as needed. I refilled the prescription for Celebrex 100 mg twice daily as needed. Orders:  -     celecoxib (CeleBREX) 100 mg capsule; Take 1 capsule (100 mg total) by mouth 2 (two) times a day as needed for mild pain    3. Dyslipidemia  Assessment & Plan:  A fasting lipid panel has been ordered. The patient's goal LDL cholesterol is less than 70. Continue rosuvastatin 5 mg daily    Orders:  -     Lipid Panel with Direct LDL reflex; Future; Expected date: 09/29/2023    4. Other fatigue  -     CBC and differential; Future; Expected date: 09/29/2023  -     TSH, 3rd generation with Free T4 reflex; Future; Expected date: 09/29/2023    5. Encounter for hepatitis C screening test for low risk patient  -     Hepatitis C antibody; Future    6. Prostate cancer screening  -     PSA, Total Screen; Future    7. Encounter for immunization  -     influenza vaccine, quadrivalent, 0.5 mL, preservative-free, for adult and pediatric patients 6 mos+ (AFLURIA, FLUARIX, FLULAVAL, FLUZONE)    8. Essential hypertension, benign  Assessment & Plan:  Checked the patient's blood pressure today and found his blood pressure to be 136/70. The patient will continue amlodipine 5 mg daily, lisinopril 40 mg daily, and hydrochlorothiazide 25 mg daily. Subjective      Is a 71-year-old white male who presents to the office today for his routine checkup. The patient is doing well has no complaints. He checks his blood sugars regularly and has found them to be running between 100- 120. He is walking regularly for exercise. He works from home. He feels well and has no complaints. Review of Systems   Constitutional: Negative for activity change, appetite change and unexpected weight change. Cardiovascular: Negative for chest pain, palpitations and leg swelling. Gastrointestinal: Negative for abdominal distention, abdominal pain, blood in stool, constipation, diarrhea and nausea.    Genitourinary:        He denies nocturia and weak urinary stream       Current Outpatient Medications on File Prior to Visit   Medication Sig   • amLODIPine (NORVASC) 5 mg tablet TAKE 1 TABLET DAILY   • Blood Glucose Monitoring Suppl (OneTouch Verio Reflect) w/Device KIT Check blood sugars once daily. Please substitute with appropriate alternative as covered by patient's insurance. Dx: E11.65   • fluticasone (FLONASE) 50 mcg/act nasal spray USE 2 SPRAYS IN EACH NOSTRIL DAILY   • glucose blood (OneTouch Verio) test strip CHECK BLOOD SUGARS ONCE DAILY   • hydrochlorothiazide (HYDRODIURIL) 25 mg tablet TAKE 1 TABLET DAILY   • Lancets (OneTouch Delica Plus [id number]) MISC CHECK BLOOD SUGARS ONCE DAILY   • lisinopril (ZESTRIL) 40 mg tablet TAKE 1 TABLET DAILY   • metFORMIN (GLUCOPHAGE) 500 mg tablet TAKE 1 TABLET TWICE A DAY WITH MEALS   • omeprazole (PriLOSEC OTC) 20 MG tablet Take 20 mg by mouth daily   • rosuvastatin (CRESTOR) 5 mg tablet TAKE 1 TABLET DAILY   • sildenafil (VIAGRA) 100 mg tablet Take 1 tablet (100 mg total) by mouth daily as needed for erectile dysfunction       Objective     /70   Pulse 76   Temp (!) 95.8 °F (35.4 °C) (Tympanic)   Ht 6' 3" (1.905 m)   Wt 125 kg (276 lb 9.6 oz)   SpO2 97%   BMI 34.57 kg/m²     Physical Exam  Vitals reviewed. Constitutional:       Comments: Patient is a 59-year-old white male who appears his stated age. He is pleasant, cooperative, and in no distress   HENT:      Head: Normocephalic and atraumatic. Right Ear: Tympanic membrane, ear canal and external ear normal. There is no impacted cerumen. Left Ear: Tympanic membrane, ear canal and external ear normal. There is no impacted cerumen. Mouth/Throat:      Mouth: Mucous membranes are moist.      Pharynx: Oropharynx is clear. No oropharyngeal exudate or posterior oropharyngeal erythema. Eyes:      General: No scleral icterus. Right eye: No discharge. Left eye: No discharge. Conjunctiva/sclera: Conjunctivae normal.      Pupils: Pupils are equal, round, and reactive to light. Cardiovascular:      Rate and Rhythm: Normal rate and regular rhythm.       Pulses: no weak pulses          Dorsalis pedis pulses are 2+ on the right side and 2+ on the left side. Posterior tibial pulses are 2+ on the right side and 2+ on the left side. Heart sounds: Normal heart sounds. No murmur heard. No friction rub. No gallop. Pulmonary:      Effort: Pulmonary effort is normal. No respiratory distress. Breath sounds: Normal breath sounds. No stridor. No wheezing, rhonchi or rales. Abdominal:      General: Bowel sounds are normal. There is no distension. Palpations: Abdomen is soft. There is no mass. Tenderness: There is no abdominal tenderness. There is no guarding. Musculoskeletal:      Cervical back: Neck supple. Right lower leg: No edema. Left lower leg: No edema. Feet:      Right foot:      Skin integrity: No ulcer, skin breakdown, erythema, warmth, callus or dry skin. Left foot:      Skin integrity: No ulcer, skin breakdown, erythema, warmth, callus or dry skin. Lymphadenopathy:      Cervical: No cervical adenopathy. Psychiatric:         Mood and Affect: Mood normal.         Behavior: Behavior normal.         Thought Content: Thought content normal.         Judgment: Judgment normal.        Patient's shoes and socks removed. Right Foot/Ankle   Right Foot Inspection  Skin Exam: skin normal and skin intact. No dry skin, no warmth, no callus, no erythema, no maceration, no abnormal color, no pre-ulcer, no ulcer and no callus. Toe Exam: No swelling, no tenderness, erythema and  no right toe deformity    Sensory   Vibration: intact  Proprioception: intact      Vascular  Capillary refills: < 3 seconds  The right DP pulse is 2+. The right PT pulse is 2+. Left Foot/Ankle  Left Foot Inspection  Skin Exam: skin normal and skin intact. No dry skin, no warmth, no erythema, no maceration, normal color, no pre-ulcer, no ulcer and no callus. Toe Exam: No swelling, no tenderness, no erythema and no left toe deformity.      Sensory   Vibration: intact  Proprioception: intact  Monofilament testing: intact    Vascular  Capillary refills: < 3 seconds  The left DP pulse is 2+. The left PT pulse is 2+.      Assign Risk Category  No deformity present  No loss of protective sensation  No weak pulses  Risk: 0      Christel Luigi, DO

## 2023-10-02 DIAGNOSIS — E83.52 HYPERCALCEMIA: Primary | ICD-10-CM

## 2023-10-04 VITALS
HEART RATE: 76 BPM | WEIGHT: 276.6 LBS | BODY MASS INDEX: 34.39 KG/M2 | SYSTOLIC BLOOD PRESSURE: 136 MMHG | HEIGHT: 75 IN | OXYGEN SATURATION: 97 % | TEMPERATURE: 95.8 F | DIASTOLIC BLOOD PRESSURE: 70 MMHG

## 2023-10-04 PROBLEM — Z11.59 ENCOUNTER FOR HEPATITIS C SCREENING TEST FOR LOW RISK PATIENT: Status: ACTIVE | Noted: 2023-10-04

## 2023-10-04 NOTE — ASSESSMENT & PLAN NOTE
A fasting lipid panel has been ordered. The patient's goal LDL cholesterol is less than 70.   Continue rosuvastatin 5 mg daily

## 2023-10-04 NOTE — ASSESSMENT & PLAN NOTE
Patient is doing much better with regards to his right shoulder. However, he did request a refill for Celebrex to take as needed. I refilled the prescription for Celebrex 100 mg twice daily as needed.

## 2023-10-04 NOTE — ASSESSMENT & PLAN NOTE
Checked the patient's blood pressure today and found his blood pressure to be 136/70. The patient will continue amlodipine 5 mg daily, lisinopril 40 mg daily, and hydrochlorothiazide 25 mg daily.

## 2023-10-16 DIAGNOSIS — I10 ESSENTIAL HYPERTENSION, BENIGN: ICD-10-CM

## 2023-10-16 RX ORDER — LISINOPRIL 40 MG/1
TABLET ORAL
Qty: 90 TABLET | Refills: 3 | Status: SHIPPED | OUTPATIENT
Start: 2023-10-16

## 2023-10-25 LAB
LEFT EYE DIABETIC RETINOPATHY: NORMAL
RIGHT EYE DIABETIC RETINOPATHY: NORMAL
SEVERITY (EYE EXAM): NORMAL

## 2023-10-30 DIAGNOSIS — I10 ESSENTIAL HYPERTENSION, BENIGN: ICD-10-CM

## 2023-10-30 RX ORDER — AMLODIPINE BESYLATE 5 MG/1
TABLET ORAL
Qty: 90 TABLET | Refills: 3 | Status: SHIPPED | OUTPATIENT
Start: 2023-10-30

## 2023-11-09 DIAGNOSIS — I10 ESSENTIAL HYPERTENSION, BENIGN: ICD-10-CM

## 2023-11-09 RX ORDER — HYDROCHLOROTHIAZIDE 25 MG/1
25 TABLET ORAL DAILY
Qty: 90 TABLET | Refills: 3 | Status: SHIPPED | OUTPATIENT
Start: 2023-11-09

## 2024-02-21 PROBLEM — Z12.5 PROSTATE CANCER SCREENING: Status: RESOLVED | Noted: 2020-06-08 | Resolved: 2024-02-21

## 2024-02-21 PROBLEM — Z12.11 COLON CANCER SCREENING: Status: RESOLVED | Noted: 2020-06-08 | Resolved: 2024-02-21

## 2024-03-22 ENCOUNTER — RA CDI HCC (OUTPATIENT)
Dept: OTHER | Facility: HOSPITAL | Age: 64
End: 2024-03-22

## 2024-03-22 PROBLEM — Z23 ENCOUNTER FOR IMMUNIZATION: Status: RESOLVED | Noted: 2021-12-10 | Resolved: 2024-03-22

## 2024-03-29 ENCOUNTER — TELEPHONE (OUTPATIENT)
Dept: ADMINISTRATIVE | Facility: OTHER | Age: 64
End: 2024-03-29

## 2024-03-29 ENCOUNTER — OFFICE VISIT (OUTPATIENT)
Dept: FAMILY MEDICINE CLINIC | Facility: CLINIC | Age: 64
End: 2024-03-29
Payer: COMMERCIAL

## 2024-03-29 ENCOUNTER — APPOINTMENT (OUTPATIENT)
Dept: LAB | Facility: CLINIC | Age: 64
End: 2024-03-29
Payer: COMMERCIAL

## 2024-03-29 VITALS
TEMPERATURE: 96.2 F | SYSTOLIC BLOOD PRESSURE: 144 MMHG | HEIGHT: 75 IN | BODY MASS INDEX: 35.67 KG/M2 | OXYGEN SATURATION: 98 % | WEIGHT: 286.9 LBS | HEART RATE: 77 BPM | DIASTOLIC BLOOD PRESSURE: 67 MMHG

## 2024-03-29 DIAGNOSIS — E78.5 DYSLIPIDEMIA: ICD-10-CM

## 2024-03-29 DIAGNOSIS — Z00.00 ANNUAL PHYSICAL EXAM: Primary | ICD-10-CM

## 2024-03-29 DIAGNOSIS — E11.9 TYPE 2 DIABETES MELLITUS WITHOUT COMPLICATION, WITHOUT LONG-TERM CURRENT USE OF INSULIN (HCC): ICD-10-CM

## 2024-03-29 LAB
ALBUMIN SERPL BCP-MCNC: 4.6 G/DL (ref 3.5–5)
ALP SERPL-CCNC: 48 U/L (ref 34–104)
ALT SERPL W P-5'-P-CCNC: 34 U/L (ref 7–52)
ANION GAP SERPL CALCULATED.3IONS-SCNC: 9 MMOL/L (ref 4–13)
AST SERPL W P-5'-P-CCNC: 44 U/L (ref 13–39)
BILIRUB SERPL-MCNC: 2.48 MG/DL (ref 0.2–1)
BUN SERPL-MCNC: 15 MG/DL (ref 5–25)
CALCIUM SERPL-MCNC: 10.1 MG/DL (ref 8.4–10.2)
CHLORIDE SERPL-SCNC: 99 MMOL/L (ref 96–108)
CHOLEST SERPL-MCNC: 104 MG/DL
CO2 SERPL-SCNC: 30 MMOL/L (ref 21–32)
CREAT SERPL-MCNC: 0.98 MG/DL (ref 0.6–1.3)
EST. AVERAGE GLUCOSE BLD GHB EST-MCNC: 114 MG/DL
GFR SERPL CREATININE-BSD FRML MDRD: 81 ML/MIN/1.73SQ M
GLUCOSE P FAST SERPL-MCNC: 119 MG/DL (ref 65–99)
HBA1C MFR BLD: 5.6 %
HDLC SERPL-MCNC: 31 MG/DL
LDLC SERPL CALC-MCNC: 49 MG/DL (ref 0–100)
POTASSIUM SERPL-SCNC: 3.6 MMOL/L (ref 3.5–5.3)
PROT SERPL-MCNC: 7.3 G/DL (ref 6.4–8.4)
SODIUM SERPL-SCNC: 138 MMOL/L (ref 135–147)
TRIGL SERPL-MCNC: 122 MG/DL

## 2024-03-29 PROCEDURE — 83036 HEMOGLOBIN GLYCOSYLATED A1C: CPT

## 2024-03-29 PROCEDURE — 99396 PREV VISIT EST AGE 40-64: CPT | Performed by: FAMILY MEDICINE

## 2024-03-29 PROCEDURE — 80061 LIPID PANEL: CPT

## 2024-03-29 PROCEDURE — 36415 COLL VENOUS BLD VENIPUNCTURE: CPT

## 2024-03-29 PROCEDURE — 80053 COMPREHEN METABOLIC PANEL: CPT

## 2024-03-29 RX ORDER — BLOOD-GLUCOSE METER
KIT MISCELLANEOUS
Qty: 1 KIT | Refills: 0 | Status: SHIPPED | OUTPATIENT
Start: 2024-03-29

## 2024-03-29 NOTE — TELEPHONE ENCOUNTER
----- Message from Celina Waterman sent at 3/29/2024  8:06 AM EDT -----  Regarding: care gap request  03/29/24 8:06 AM    Hello, our patient attached above has had Diabetic Eye Exam completed/performed. Please assist in updating the patient chart by making an External outreach to [facility] located in [place], [place]. The date of service is 2024.    Thank you,  Celina Waterman  [facility]

## 2024-03-29 NOTE — ASSESSMENT & PLAN NOTE
Is a 63-year-old white male who presents for office today for his annual physical exam.  His main complaint today is pain in the left wrist.  It is chronic.  He tells me it first began a few years ago when he was golfing.  He hit the turf on his golf swing and developed pain in his left wrist.  He then reinjured it when he hit a root on his golf swing.  He says since that he has had chronic pain in the left wrist.  It really bad.  He tells me it only bothers him when it is rainy out.  Pain is along the ulnar aspect of the wrist.  Wrist never swells.  After exam of the wrist, I am concerned he may have a TFCC tear.  We discussed this.  Patient does not want to pursue a workup at this time.  I did tell him if his symptoms should worsen, he should call me.  I do note that the patient gained 10 pounds since I last saw him.  I counseled the patient regarding diet and exercise and asked him to try to lose weight.  The patient is having problems with his glucometer.  It is less than 2 is old but not functioning.  I ordered a new glucometer.  Hopefully, his insurance will cover this for him.  I reviewed his family history.  His mother was diabetic.  His father had hypertension and had prostate cancer.  I ordered routine fasting blood work for the patient.  I will contact him with these results.  I am going to have him continue his current medical regimen at this time, pending results of his blood work.

## 2024-03-29 NOTE — LETTER
Diabetic Eye Exam Form    Date Requested: 24  Patient: Gustavo Moore  Patient : 1960   Referring Provider: Elías Cottrell,       DIABETIC Eye Exam Date _______________________________      Type of Exam MUST be documented for Diabetic Eye Exams. Please CHECK ONE.     Retinal Exam       Dilated Retinal Exam       OCT       Optomap-Iris Exam      Fundus Photography       Left Eye - Please check Retinopathy or No Retinopathy        Exam did show retinopathy    Exam did not show retinopathy       Right Eye - Please check Retinopathy or No Retinopathy       Exam did show retinopathy    Exam did not show retinopathy       Comments __________________________________________________________    Practice Providing Exam ______________________________________________    Exam Performed By (print name) _______________________________________      Provider Signature ___________________________________________________      These reports are needed for  compliance.  Please fax this completed form and a copy of the Diabetic Eye Exam report to our office located at [address] [place] [address] as soon as possible via Fax [phone] attention Celena: Phone [phone]  We thank you for your assistance in treating our mutual patient.

## 2024-03-29 NOTE — PATIENT INSTRUCTIONS
Basic Carbohydrate Counting   AMBULATORY CARE:   Carbohydrate counting  is a way to plan your meals by counting the amount of carbohydrate in foods. Carbohydrates are the sugars, starches, and fiber found in fruit, grains, vegetables, and milk products. Carbohydrates increase your blood sugar levels. Carbohydrate counting can help you eat the right amount of carbohydrate to keep your blood sugar levels under control.   What you need to know about planning meals using carbohydrate counting:  A dietitian or healthcare provider will help you develop a healthy meal plan that works best for you. You will be taught how much carbohydrate to eat or drink for each meal and snack. Your meal plan will be based on your age, weight, usual food intake, and physical activity level. If you have diabetes, it will also include your blood sugar levels and diabetes medicine. Once you know how much carbohydrate you should eat, you can decide what type of food you want to eat.    You will need to know what foods contain carbohydrate and how much they contain. Keep track of the amount of carbohydrate in meals and snacks in order to follow your meal plan. Do not avoid carbohydrates or skip meals. Your blood sugar may fall too low if you do not eat enough carbohydrate or you skip meals.    Foods that contain carbohydrate:   Breads:  Each serving of food listed below contains about 15 g of carbohydrate .    1 slice of bread (1 ounce) or 1 flour or corn tortilla (6 inch)    ½ of a hamburger bun or ¼ of a large bagel (about 1 ounce)    1 pancake (about 4 inches across and ¼ inch thick)    Cereals and grains:  Serving sizes of ready-to-eat cereals vary. Look at the serving size and the total carbohydrate amount listed on the food label. Each serving of food listed below contains about 15 g of carbohydrate .    ¾ cup of dry, unsweetened, ready-to-eat cereal or ¼ cup of low-fat granola     ½ cup of oatmeal or other cooked cereal     ? cup of  cooked rice or pasta    Starchy vegetables and beans:  Each serving of food listed below contains about 15 g of carbohydrate .    ½ cup of corn, green peas, sweet potatoes, or mashed potatoes    ¼ of a large baked potato    ½ cup of beans, lentils, and peas (garbanzo, angela, kidney, white, split, black-eyed)    Crackers and snacks:  Each serving of food listed below contains about 15 g of carbohydrate .    3 maddi cracker squares or 8 animal crackers     6 saltine-type crackers    3 cups of popcorn or ¾ ounce of pretzels, potato chips, or tortilla chips    Fruit:  Each serving of food listed below contains about 15 g of carbohydrate .    1 small (4 ounce) piece of fresh fruit or ¾ to 1 cup of fresh fruit    ½ cup of canned or frozen fruit, packed in natural juice    ½ cup (4 ounces) of unsweetened fruit juice    2 tablespoons of dried fruit    Desserts or sugary foods:  Each serving of food listed below contains about 15 g of carbohydrate .    2-inch square unfrosted cake or brownie     2 small cookies    ½ cup of ice cream, frozen yogurt, or nondairy frozen yogurt    ¼ cup of sherbet or sorbet    1 tablespoon of regular syrup, jam, or jelly    2 tablespoons of light syrup    Milk and yogurt:  Foods from the milk group contain about 12 g of carbohydrate per serving.    1 cup of fat-free or low-fat milk    1 cup of soy milk    ? cup of fat-free, yogurt sweetened with artificial sweetener    Non-starchy vegetables:  Each serving contains about 5 g of carbohydrate . Three servings of non-starch vegetables count as 1 carbohydrate serving.     ½ cup of cooked vegetables or 1 cup of raw vegetables. This includes beets, broccoli, cabbage, cauliflower, cucumber, mushrooms, tomatoes, and zucchini    ½ cup of vegetable juice    How to use carbohydrate counting to plan meals:   Count carbohydrate amounts using serving sizes:      Pasta dinner example:  You plan to have pasta, tossed salad, and an 8-ounce glass of milk. Your  healthcare provider tells you that you may have 4 carbohydrate servings for dinner. One carbohydrate serving of pasta is ? cup. One cup of pasta will equal 3 carbohydrate servings. An 8-ounce glass of milk will count as 1 carbohydrate serving. These amounts of food would equal 4 carbohydrate servings. One cup of tossed salad does not count toward your carbohydrate servings.       Count carbohydrate amounts using food labels:  Find the total amount of carbohydrate in a packaged food by reading the food label. Food labels tell you the serving size of the food and the total carbohydrate amount in each serving. Find the serving size on the food label and then decide how many servings you will eat. Multiply the number of servings you plan to eat by the carbohydrate amount per serving.     Granola bar snack example:  Your meal plan allows you to have 2 carbohydrate servings (30 grams) of carbohydrate for a snack. You plan to eat 1 package of granola bars, which contains 2 bars. According to the food label, the serving size of food in this package is 1 bar. Each serving (1 bar) contains 25 grams of carbohydrate. The total amount of carbohydrate in this package of granola bars would be 50 g. Based on your meal plan, you should eat only 1 bar.      Follow up with your doctor as directed:  Write down your questions so you remember to ask them during your visits.  © Copyright [employer] 2023 Information is for End User's use only and may not be sold, redistributed or otherwise used for commercial purposes.  The above information is an  only. It is not intended as medical advice for individual conditions or treatments. Talk to your doctor, nurse or pharmacist before following any medical regimen to see if it is safe and effective for you.

## 2024-03-29 NOTE — PROGRESS NOTES
ADULT ANNUAL PHYSICAL  [facility] PRIMARY CARE    NAME: Gustavo Moore  AGE: 63 y.o. SEX: male  : 1960     DATE: 3/29/2024     Assessment and Plan:     Problem List Items Addressed This Visit          Endocrine    Type 2 diabetes mellitus without complication, without long-term current use of insulin (HCC)    Relevant Medications    Blood Glucose Monitoring Suppl (OneTouch Verio Reflect) w/Device KIT    Other Relevant Orders    Hemoglobin A1C    Comprehensive metabolic panel       Other    Dyslipidemia    Relevant Orders    Lipid Panel with Direct LDL reflex    Annual physical exam - Primary     Is a 63-year-old white male who presents for office today for his annual physical exam.  His main complaint today is pain in the left wrist.  It is chronic.  He tells me it first began a few years ago when he was golfing.  He hit the turf on his golf swing and developed pain in his left wrist.  He then reinjured it when he hit a root on his golf swing.  He says since that he has had chronic pain in the left wrist.  It really bad.  He tells me it only bothers him when it is rainy out.  Pain is along the ulnar aspect of the wrist.  Wrist never swells.  After exam of the wrist, I am concerned he may have a TFCC tear.  We discussed this.  Patient does not want to pursue a workup at this time.  I did tell him if his symptoms should worsen, he should call me.  I do note that the patient gained 10 pounds since I last saw him.  I counseled the patient regarding diet and exercise and asked him to try to lose weight.  The patient is having problems with his glucometer.  It is less than 2 is old but not functioning.  I ordered a new glucometer.  Hopefully, his insurance will cover this for him.  I reviewed his family history.  His mother was diabetic.  His father had hypertension and had prostate cancer.  I ordered routine fasting blood work for the patient.  I will contact him  with these results.  I am going to have him continue his current medical regimen at this time, pending results of his blood work.            Immunizations and preventive care screenings were discussed with patient today. Appropriate education was printed on patient's after visit summary.        Counseling:  Exercise: the importance of regular exercise/physical activity was discussed. Recommend exercise 3-5 times per week for at least 30 minutes.   Weight loss      Depression Screening and Follow-up Plan: Patient was screened for depression during today's encounter. They screened negative with a PHQ-2 score of 0.        Return in about 6 months (around 9/29/2024).     Chief Complaint:     Chief Complaint   Patient presents with    Annual Exam      History of Present Illness:     Adult Annual Physical   Patient here for a comprehensive physical exam. The patient reports problems - L-wrist pain .    Diet and Physical Activity  Diet/Nutrition: diabetic diet.   Exercise: walking.      Depression Screening  PHQ-2/9 Depression Screening    Little interest or pleasure in doing things: 0 - not at all  Feeling down, depressed, or hopeless: 0 - not at all  PHQ-2 Score: 0  PHQ-2 Interpretation: Negative depression screen       General Health  Sleep: gets 4-6 hours of sleep on average.   Hearing: normal - bilateral.  Vision: no vision problems.   Dental: regular dental visits.        Health  Symptoms include: erectile dysfunction    Advanced Care Planning  Do you have an advanced directive? no  Do you have a durable medical power of ? no  ACP document given to patient? no     Review of Systems:     Review of Systems   Constitutional:  Negative for activity change and appetite change.   HENT:  Positive for tinnitus. Negative for hearing loss.    Eyes:  Negative for visual disturbance.   Cardiovascular:  Negative for chest pain, palpitations and leg swelling.   Gastrointestinal:  Negative for abdominal distention,  abdominal pain, blood in stool, constipation and diarrhea.   Genitourinary:         Denies nocturia and weak urinary stream.  Positive for erectile dysfunction      Past Medical History:     Past Medical History:   Diagnosis Date    Encounter for immunization 12/10/2021    GERD without esophagitis     last assessed: 6/16/2017    Hypertension     last assessed: 6/22/2018    Lumbar degenerative disc disease     last assessed: 4/4/2014    Palpitations     last assessed: 12/16/2016      Past Surgical History:     Past Surgical History:   Procedure Laterality Date    BACK SURGERY      CHOLECYSTECTOMY      KNEE SURGERY        Family History:     Family History   Problem Relation Age of Onset    Diabetes Mother     Hypertension Father       Social History:     Social History     Socioeconomic History    Marital status:      Spouse name: None    Number of children: None    Years of education: None    Highest education level: None   Occupational History    None   Tobacco Use    Smoking status: Former     Current packs/day: 0.00     Average packs/day: 1 pack/day for 36.0 years (36.0 ttl pk-yrs)     Types: Cigarettes     Start date: 1978     Quit date: 2014     Years since quitting: 10.2     Passive exposure: Past    Smokeless tobacco: Never   Vaping Use    Vaping status: Never Used   Substance and Sexual Activity    Alcohol use: Not Currently    Drug use: Never    Sexual activity: Not Currently   Other Topics Concern    None   Social History Narrative    None     Social Determinants of Health     Financial Resource Strain: Not on file   Food Insecurity: Not on file   Transportation Needs: Not on file   Physical Activity: Not on file   Stress: Not on file   Social Connections: Not on file   Intimate Partner Violence: Not on file   Housing Stability: Not on file      Current Medications:     Current Outpatient Medications   Medication Sig Dispense Refill    amLODIPine (NORVASC) 5 mg tablet TAKE 1 TABLET DAILY 90 tablet  "3    Blood Glucose Monitoring Suppl (OneTouch Verio Reflect) w/Device KIT Check blood sugars once daily. Please substitute with appropriate alternative as covered by patient's insurance. Dx: E11.65 1 kit 0    celecoxib (CeleBREX) 100 mg capsule Take 1 capsule (100 mg total) by mouth 2 (two) times a day as needed for mild pain 40 capsule 0    fluticasone (FLONASE) 50 mcg/act nasal spray USE 2 SPRAYS IN EACH NOSTRIL DAILY 48 g 3    glucose blood (OneTouch Verio) test strip CHECK BLOOD SUGARS ONCE DAILY 100 strip 3    hydrochlorothiazide (HYDRODIURIL) 25 mg tablet Take 1 tablet (25 mg total) by mouth daily 90 tablet 3    Lancets (OneTouch Delica Plus [id number]) MISC CHECK BLOOD SUGARS ONCE DAILY 100 each 3    lisinopril (ZESTRIL) 40 mg tablet TAKE 1 TABLET DAILY 90 tablet 3    metFORMIN (GLUCOPHAGE) 500 mg tablet TAKE 1 TABLET TWICE A DAY WITH MEALS 180 tablet 3    omeprazole (PriLOSEC OTC) 20 MG tablet Take 20 mg by mouth daily      rosuvastatin (CRESTOR) 5 mg tablet TAKE 1 TABLET DAILY 90 tablet 3    sildenafil (VIAGRA) 100 mg tablet Take 1 tablet (100 mg total) by mouth daily as needed for erectile dysfunction 10 tablet 3     No current facility-administered medications for this visit.      Allergies:     Allergies   Allergen Reactions    Aspirin Nausea Only      Physical Exam:     /67   Pulse 77   Temp (!) 96.2 °F (35.7 °C) (Tympanic)   Ht 6' 3\" (1.905 m)   Wt 130 kg (286 lb 14.4 oz)   SpO2 98%   BMI 35.86 kg/m²     Physical Exam  Vitals reviewed.   Constitutional:       Comments: This is a 63-year-old white male for stated age.  He is pleasant, cooperative, and in no distress.   HENT:      Head: Normocephalic and atraumatic.      Right Ear: Tympanic membrane, ear canal and external ear normal. There is no impacted cerumen.      Left Ear: Tympanic membrane, ear canal and external ear normal. There is no impacted cerumen.      Mouth/Throat:      Mouth: Mucous membranes are moist.      Pharynx: Oropharynx " is clear. No oropharyngeal exudate or posterior oropharyngeal erythema.   Eyes:      General: No scleral icterus.        Right eye: No discharge.         Left eye: No discharge.      Conjunctiva/sclera: Conjunctivae normal.      Pupils: Pupils are equal, round, and reactive to light.   Neck:      Vascular: No carotid bruit.      Comments: There is no thyromegaly  Cardiovascular:      Rate and Rhythm: Normal rate and regular rhythm.      Pulses:           Dorsalis pedis pulses are 2+ on the right side and 2+ on the left side.        Posterior tibial pulses are 2+ on the right side and 2+ on the left side.      Heart sounds: Normal heart sounds. No murmur heard.     No friction rub. No gallop.   Pulmonary:      Effort: Pulmonary effort is normal. No respiratory distress.      Breath sounds: Normal breath sounds. No stridor. No wheezing, rhonchi or rales.   Abdominal:      General: Bowel sounds are normal. There is no distension.      Palpations: Abdomen is soft. There is no mass.      Tenderness: There is no abdominal tenderness. There is no guarding.      Comments: No organomegaly   Musculoskeletal:      Cervical back: Neck supple.   Feet:      Right foot:      Skin integrity: No ulcer, skin breakdown, erythema, warmth, callus or dry skin.      Left foot:      Skin integrity: No ulcer, skin breakdown, erythema, warmth, callus or dry skin.   Lymphadenopathy:      Cervical: No cervical adenopathy.   Psychiatric:         Mood and Affect: Mood normal.         Behavior: Behavior normal.         Thought Content: Thought content normal.         Judgment: Judgment normal.        Patient's shoes and socks removed.    Right Foot/Ankle   Right Foot Inspection  Skin Exam: skin normal and skin intact. No dry skin, no warmth, no callus, no erythema, no maceration, no abnormal color, no pre-ulcer, no ulcer and no callus.     Toe Exam: No swelling, no tenderness, erythema and  no right toe deformity    Sensory   Vibration:  intact  Proprioception: intact  Monofilament testing: intact    Vascular  Capillary refills: < 3 seconds  The right DP pulse is 2+. The right PT pulse is 2+.     Left Foot/Ankle  Left Foot Inspection  Skin Exam: skin normal and skin intact. No dry skin, no warmth, no erythema, no maceration, normal color, no pre-ulcer, no ulcer and no callus.     Toe Exam: No swelling, no tenderness, no erythema and no left toe deformity.     Sensory   Vibration: intact  Proprioception: intact  Monofilament testing: intact    Vascular  Capillary refills: < 3 seconds  The left DP pulse is 2+. The left PT pulse is 2+.       Elías Cottrell DO  [facility] PRIMARY CARE

## 2024-04-01 NOTE — TELEPHONE ENCOUNTER
Upon review of the In Basket request and the patient's chart, initial outreach has been made via fax to facility. Please see Contacts section for details.     Thank you  Celena Kendrick

## 2024-04-08 NOTE — TELEPHONE ENCOUNTER
Upon review of the In Basket request we were able to locate, review, and update the patient chart as requested for Diabetic Eye Exam.    Any additional questions or concerns should be emailed to the Practice Liaisons via the appropriate education email address, please do not reply via In Basket.    Thank you  Celena Kendrick

## 2024-05-07 DIAGNOSIS — E11.9 TYPE 2 DIABETES MELLITUS WITHOUT COMPLICATION, WITHOUT LONG-TERM CURRENT USE OF INSULIN (HCC): ICD-10-CM

## 2024-05-07 DIAGNOSIS — E78.5 DYSLIPIDEMIA: ICD-10-CM

## 2024-05-07 RX ORDER — ROSUVASTATIN CALCIUM 5 MG/1
TABLET, COATED ORAL
Qty: 90 TABLET | Refills: 1 | Status: SHIPPED | OUTPATIENT
Start: 2024-05-07

## 2024-05-07 RX ORDER — BLOOD SUGAR DIAGNOSTIC
STRIP MISCELLANEOUS
Qty: 100 STRIP | Refills: 1 | Status: SHIPPED | OUTPATIENT
Start: 2024-05-07

## 2024-05-07 RX ORDER — LANCETS 33 GAUGE
EACH MISCELLANEOUS
Qty: 100 EACH | Refills: 1 | Status: SHIPPED | OUTPATIENT
Start: 2024-05-07

## 2024-05-24 DIAGNOSIS — N52.9 IMPOTENCE OF ORGANIC ORIGIN: ICD-10-CM

## 2024-05-24 RX ORDER — SILDENAFIL 100 MG/1
100 TABLET, FILM COATED ORAL DAILY PRN
Qty: 10 TABLET | Refills: 2 | Status: SHIPPED | OUTPATIENT
Start: 2024-05-24

## 2024-06-19 ENCOUNTER — VBI (OUTPATIENT)
Dept: ADMINISTRATIVE | Facility: OTHER | Age: 64
End: 2024-06-19

## 2024-06-19 NOTE — TELEPHONE ENCOUNTER
06/19/24 9:07 AM     Chart reviewed for CRC: Colonoscopy ; nothing is submitted to the patient's insurance at this time.     Anamaria Lainez   [facility]

## 2024-07-26 DIAGNOSIS — J30.1 SEASONAL ALLERGIC RHINITIS DUE TO POLLEN: ICD-10-CM

## 2024-07-26 RX ORDER — FLUTICASONE PROPIONATE 50 MCG
SPRAY, SUSPENSION (ML) NASAL
Qty: 48 G | Refills: 3 | Status: SHIPPED | OUTPATIENT
Start: 2024-07-26

## 2024-10-04 ENCOUNTER — APPOINTMENT (OUTPATIENT)
Dept: LAB | Facility: CLINIC | Age: 64
End: 2024-10-04
Payer: COMMERCIAL

## 2024-10-04 ENCOUNTER — OFFICE VISIT (OUTPATIENT)
Dept: FAMILY MEDICINE CLINIC | Facility: CLINIC | Age: 64
End: 2024-10-04
Payer: COMMERCIAL

## 2024-10-04 VITALS
HEIGHT: 75 IN | DIASTOLIC BLOOD PRESSURE: 84 MMHG | BODY MASS INDEX: 35.52 KG/M2 | WEIGHT: 285.7 LBS | SYSTOLIC BLOOD PRESSURE: 138 MMHG | TEMPERATURE: 96.7 F | OXYGEN SATURATION: 96 %

## 2024-10-04 DIAGNOSIS — K21.9 GASTROESOPHAGEAL REFLUX DISEASE WITHOUT ESOPHAGITIS: ICD-10-CM

## 2024-10-04 DIAGNOSIS — R53.83 OTHER FATIGUE: ICD-10-CM

## 2024-10-04 DIAGNOSIS — E11.9 TYPE 2 DIABETES MELLITUS WITHOUT COMPLICATION, WITHOUT LONG-TERM CURRENT USE OF INSULIN (HCC): Primary | ICD-10-CM

## 2024-10-04 DIAGNOSIS — I10 ESSENTIAL HYPERTENSION, BENIGN: ICD-10-CM

## 2024-10-04 DIAGNOSIS — Z12.5 PROSTATE CANCER SCREENING: ICD-10-CM

## 2024-10-04 DIAGNOSIS — E78.5 DYSLIPIDEMIA: ICD-10-CM

## 2024-10-04 DIAGNOSIS — Z23 ENCOUNTER FOR IMMUNIZATION: ICD-10-CM

## 2024-10-04 DIAGNOSIS — E11.9 TYPE 2 DIABETES MELLITUS WITHOUT COMPLICATION, WITHOUT LONG-TERM CURRENT USE OF INSULIN (HCC): ICD-10-CM

## 2024-10-04 LAB
ANION GAP SERPL CALCULATED.3IONS-SCNC: 9 MMOL/L (ref 4–13)
BASOPHILS # BLD AUTO: 0.04 THOUSANDS/ΜL (ref 0–0.1)
BASOPHILS NFR BLD AUTO: 1 % (ref 0–1)
BUN SERPL-MCNC: 13 MG/DL (ref 5–25)
CALCIUM SERPL-MCNC: 9.8 MG/DL (ref 8.4–10.2)
CHLORIDE SERPL-SCNC: 103 MMOL/L (ref 96–108)
CHOLEST SERPL-MCNC: 100 MG/DL
CO2 SERPL-SCNC: 29 MMOL/L (ref 21–32)
CREAT SERPL-MCNC: 0.9 MG/DL (ref 0.6–1.3)
CREAT UR-MCNC: 117.1 MG/DL
EOSINOPHIL # BLD AUTO: 0.17 THOUSAND/ΜL (ref 0–0.61)
EOSINOPHIL NFR BLD AUTO: 2 % (ref 0–6)
ERYTHROCYTE [DISTWIDTH] IN BLOOD BY AUTOMATED COUNT: 12.8 % (ref 11.6–15.1)
EST. AVERAGE GLUCOSE BLD GHB EST-MCNC: 123 MG/DL
GFR SERPL CREATININE-BSD FRML MDRD: 89 ML/MIN/1.73SQ M
GLUCOSE P FAST SERPL-MCNC: 126 MG/DL (ref 65–99)
HBA1C MFR BLD: 5.9 %
HCT VFR BLD AUTO: 45.6 % (ref 36.5–49.3)
HDLC SERPL-MCNC: 35 MG/DL
HGB BLD-MCNC: 15.2 G/DL (ref 12–17)
IMM GRANULOCYTES # BLD AUTO: 0.02 THOUSAND/UL (ref 0–0.2)
IMM GRANULOCYTES NFR BLD AUTO: 0 % (ref 0–2)
LDLC SERPL CALC-MCNC: 50 MG/DL (ref 0–100)
LYMPHOCYTES # BLD AUTO: 1.17 THOUSANDS/ΜL (ref 0.6–4.47)
LYMPHOCYTES NFR BLD AUTO: 16 % (ref 14–44)
MCH RBC QN AUTO: 31.1 PG (ref 26.8–34.3)
MCHC RBC AUTO-ENTMCNC: 33.3 G/DL (ref 31.4–37.4)
MCV RBC AUTO: 93 FL (ref 82–98)
MICROALBUMIN UR-MCNC: <7 MG/L
MONOCYTES # BLD AUTO: 0.55 THOUSAND/ΜL (ref 0.17–1.22)
MONOCYTES NFR BLD AUTO: 8 % (ref 4–12)
NEUTROPHILS # BLD AUTO: 5.17 THOUSANDS/ΜL (ref 1.85–7.62)
NEUTS SEG NFR BLD AUTO: 73 % (ref 43–75)
NRBC BLD AUTO-RTO: 0 /100 WBCS
PLATELET # BLD AUTO: 156 THOUSANDS/UL (ref 149–390)
PMV BLD AUTO: 11.9 FL (ref 8.9–12.7)
POTASSIUM SERPL-SCNC: 4 MMOL/L (ref 3.5–5.3)
PSA SERPL-MCNC: 3.44 NG/ML (ref 0–4)
RBC # BLD AUTO: 4.89 MILLION/UL (ref 3.88–5.62)
SODIUM SERPL-SCNC: 141 MMOL/L (ref 135–147)
TRIGL SERPL-MCNC: 74 MG/DL
TSH SERPL DL<=0.05 MIU/L-ACNC: 1.84 UIU/ML (ref 0.45–4.5)
WBC # BLD AUTO: 7.12 THOUSAND/UL (ref 4.31–10.16)

## 2024-10-04 PROCEDURE — 99214 OFFICE O/P EST MOD 30 MIN: CPT | Performed by: FAMILY MEDICINE

## 2024-10-04 PROCEDURE — 84443 ASSAY THYROID STIM HORMONE: CPT

## 2024-10-04 PROCEDURE — 90673 RIV3 VACCINE NO PRESERV IM: CPT

## 2024-10-04 PROCEDURE — 80061 LIPID PANEL: CPT

## 2024-10-04 PROCEDURE — 80048 BASIC METABOLIC PNL TOTAL CA: CPT

## 2024-10-04 PROCEDURE — 82043 UR ALBUMIN QUANTITATIVE: CPT

## 2024-10-04 PROCEDURE — 36415 COLL VENOUS BLD VENIPUNCTURE: CPT

## 2024-10-04 PROCEDURE — 90471 IMMUNIZATION ADMIN: CPT

## 2024-10-04 PROCEDURE — 83036 HEMOGLOBIN GLYCOSYLATED A1C: CPT

## 2024-10-04 PROCEDURE — G0103 PSA SCREENING: HCPCS

## 2024-10-04 PROCEDURE — 85025 COMPLETE CBC W/AUTO DIFF WBC: CPT

## 2024-10-04 PROCEDURE — 82570 ASSAY OF URINE CREATININE: CPT

## 2024-10-04 NOTE — ASSESSMENT & PLAN NOTE
Patient has type 2 diabetes mellitus.  Diabetes has been under excellent control.  Going to have him get fasting labs while he is here today.  I also ordered a urine albumin to creatinine ratio.  I will contact him with the results.  We discussed routine diabetic footcare.  Patient plans on scheduling a dilated eye exam later this year.  Lab Results   Component Value Date    HGBA1C 5.6 03/29/2024       Orders:    Albumin / creatinine urine ratio; Future    Basic metabolic panel; Future    Hemoglobin A1C; Future

## 2024-10-04 NOTE — PROGRESS NOTES
Ambulatory Visit  Name: Gustavo Moore      : 1960      MRN: [id number]  Encounter Provider: Elías Cottrell DO  Encounter Date: 10/4/2024   Encounter department: [facility] PRIMARY CARE    Assessment & Plan  Encounter for immunization    Orders:    influenza vaccine, recombinant, PF, 0.5 mL IM (Flublok)    Other fatigue    Orders:    TSH, 3rd generation with Free T4 reflex; Future    CBC and differential; Future    Dyslipidemia  A fasting lipid panel was ordered.  His goal LDL cholesterol is less than 70.  He will continue rosuvastatin 5 mg daily  Orders:    Lipid Panel with Direct LDL reflex; Future    Type 2 diabetes mellitus without complication, without long-term current use of insulin (HCC)  Patient has type 2 diabetes mellitus.  Diabetes has been under excellent control.  Going to have him get fasting labs while he is here today.  I also ordered a urine albumin to creatinine ratio.  I will contact him with the results.  We discussed routine diabetic footcare.  Patient plans on scheduling a dilated eye exam later this year.  Lab Results   Component Value Date    HGBA1C 5.6 2024       Orders:    Albumin / creatinine urine ratio; Future    Basic metabolic panel; Future    Hemoglobin A1C; Future    Prostate cancer screening    Orders:    PSA, Total Screen; Future    Essential hypertension, benign  Blood pressure is under excellent control.  I checked his blood pressure myself today.  I found his blood pressure to be 126/68.  The patient will continue lisinopril 40 mg daily, amlodipine 5 mg daily, and hydrochlorothiazide 25 mg daily.  Patient tells me he will occasionally check his blood pressures at home and they have been well-controlled at home as well.         Gastroesophageal reflux disease without esophagitis  Gastroesophageal reflux disease is currently stable.  He is taking Prilosec OTC daily.            History of Present Illness     This is a 64-year-old white male who presents to  "the office today for his routine checkup.  The patient is doing well and has no complaints.  He has been checking his blood sugars regularly.  He did not have a logbook with him but he tells me they run between 100-120.          Review of Systems   HENT:  Positive for hearing loss and tinnitus.    Eyes:  Negative for visual disturbance.   Cardiovascular:  Negative for chest pain, palpitations and leg swelling.   Gastrointestinal:  Negative for abdominal distention, abdominal pain, blood in stool, constipation and diarrhea.   Genitourinary:         Denies nocturia.  Reports urinary dribbling.  Reports weak stream.           Objective     /84   Temp (!) 96.7 °F (35.9 °C) (Tympanic)   Ht 6' 3\" (1.905 m)   Wt 130 kg (285 lb 11.2 oz)   SpO2 96%   BMI 35.71 kg/m²     Physical Exam  Vitals reviewed.   Constitutional:       Comments: This is a 64-year-old white male who appears his stated age.  Is pleasant, cooperative, and in no distress.   HENT:      Head: Normocephalic and atraumatic.      Right Ear: Tympanic membrane, ear canal and external ear normal. There is no impacted cerumen.      Left Ear: Tympanic membrane, ear canal and external ear normal. There is no impacted cerumen.      Mouth/Throat:      Mouth: Mucous membranes are moist.      Pharynx: Oropharynx is clear. No oropharyngeal exudate or posterior oropharyngeal erythema.   Eyes:      General: No scleral icterus.        Right eye: No discharge.         Left eye: No discharge.      Conjunctiva/sclera: Conjunctivae normal.      Pupils: Pupils are equal, round, and reactive to light.   Neck:      Vascular: No carotid bruit.      Comments: No thyromegaly is present  Cardiovascular:      Rate and Rhythm: Normal rate and regular rhythm.      Pulses: no weak pulses.           Dorsalis pedis pulses are 2+ on the right side and 2+ on the left side.        Posterior tibial pulses are 2+ on the right side and 2+ on the left side.      Heart sounds: Normal " heart sounds. No murmur heard.     No friction rub. No gallop.   Pulmonary:      Effort: Pulmonary effort is normal. No respiratory distress.      Breath sounds: Normal breath sounds. No stridor. No wheezing, rhonchi or rales.   Abdominal:      General: Bowel sounds are normal. There is no distension.      Palpations: Abdomen is soft. There is no mass.      Tenderness: There is no abdominal tenderness. There is no guarding.      Comments: There is no organomegaly   Musculoskeletal:      Cervical back: Neck supple.      Right lower leg: No edema.      Left lower leg: No edema.   Feet:      Right foot:      Skin integrity: No ulcer, skin breakdown, erythema, warmth, callus or dry skin.      Left foot:      Skin integrity: No ulcer, skin breakdown, erythema, warmth, callus or dry skin.   Lymphadenopathy:      Cervical: No cervical adenopathy.   Psychiatric:         Mood and Affect: Mood normal.         Behavior: Behavior normal.         Thought Content: Thought content normal.         Judgment: Judgment normal.     Patient's shoes and socks removed.    Right Foot/Ankle   Right Foot Inspection  Skin Exam: skin normal and skin intact. No dry skin, no warmth, no callus, no erythema, no maceration, no abnormal color, no pre-ulcer, no ulcer and no callus.     Toe Exam: No swelling, no tenderness, erythema and  no right toe deformity    Sensory   Vibration: intact  Proprioception: intact  Monofilament testing: intact    Vascular  Capillary refills: < 3 seconds  The right DP pulse is 2+. The right PT pulse is 2+.     Left Foot/Ankle  Left Foot Inspection  Skin Exam: skin normal and skin intact. No dry skin, no warmth, no erythema, no maceration, normal color, no pre-ulcer, no ulcer and no callus.     Toe Exam: No swelling, no tenderness, no erythema and no left toe deformity.     Sensory   Vibration: diminished  Proprioception: intact  Monofilament testing: diminished    Vascular  Capillary refills: < 3 seconds  The left DP  pulse is 2+. The left PT pulse is 2+.     Assign Risk Category  No deformity present  Loss of protective sensation  No weak pulses  Risk: 1

## 2024-10-04 NOTE — ASSESSMENT & PLAN NOTE
A fasting lipid panel was ordered.  His goal LDL cholesterol is less than 70.  He will continue rosuvastatin 5 mg daily  Orders:    Lipid Panel with Direct LDL reflex; Future

## 2024-10-04 NOTE — ASSESSMENT & PLAN NOTE
Blood pressure is under excellent control.  I checked his blood pressure myself today.  I found his blood pressure to be 126/68.  The patient will continue lisinopril 40 mg daily, amlodipine 5 mg daily, and hydrochlorothiazide 25 mg daily.  Patient tells me he will occasionally check his blood pressures at home and they have been well-controlled at home as well.

## 2024-10-07 ENCOUNTER — TELEPHONE (OUTPATIENT)
Dept: FAMILY MEDICINE CLINIC | Facility: CLINIC | Age: 64
End: 2024-10-07

## 2024-10-07 NOTE — TELEPHONE ENCOUNTER
----- Message from Elías Cottrell DO sent at 10/7/2024  8:50 AM EDT -----  Fasting blood sugar is 126. Hgba1c is 6=5.9 (excellent). Urine test was normal. Thyroid test was normal. LDL Chol 50 (<70 is goal). CBC is normal also. Stay on same medication

## 2024-10-08 DIAGNOSIS — I10 ESSENTIAL HYPERTENSION, BENIGN: ICD-10-CM

## 2024-10-09 RX ORDER — LISINOPRIL 40 MG/1
TABLET ORAL
Qty: 90 TABLET | Refills: 1 | Status: SHIPPED | OUTPATIENT
Start: 2024-10-09

## 2024-10-24 DIAGNOSIS — I10 ESSENTIAL HYPERTENSION, BENIGN: ICD-10-CM

## 2024-10-24 RX ORDER — AMLODIPINE BESYLATE 5 MG/1
TABLET ORAL
Qty: 90 TABLET | Refills: 1 | Status: SHIPPED | OUTPATIENT
Start: 2024-10-24

## 2024-10-31 LAB
LEFT EYE DIABETIC RETINOPATHY: NORMAL
RIGHT EYE DIABETIC RETINOPATHY: NORMAL

## 2024-11-04 DIAGNOSIS — I10 ESSENTIAL HYPERTENSION, BENIGN: ICD-10-CM

## 2024-11-04 DIAGNOSIS — E78.5 DYSLIPIDEMIA: ICD-10-CM

## 2024-11-04 DIAGNOSIS — E11.9 TYPE 2 DIABETES MELLITUS WITHOUT COMPLICATION, WITHOUT LONG-TERM CURRENT USE OF INSULIN (HCC): ICD-10-CM

## 2024-11-05 RX ORDER — BLOOD SUGAR DIAGNOSTIC
STRIP MISCELLANEOUS
Qty: 100 STRIP | Refills: 1 | Status: SHIPPED | OUTPATIENT
Start: 2024-11-05

## 2024-11-05 RX ORDER — ROSUVASTATIN CALCIUM 5 MG/1
TABLET, COATED ORAL
Qty: 90 TABLET | Refills: 1 | Status: SHIPPED | OUTPATIENT
Start: 2024-11-05

## 2024-11-05 RX ORDER — LANCETS 33 GAUGE
EACH MISCELLANEOUS
Qty: 100 EACH | Refills: 1 | Status: SHIPPED | OUTPATIENT
Start: 2024-11-05

## 2024-11-05 RX ORDER — HYDROCHLOROTHIAZIDE 25 MG/1
25 TABLET ORAL DAILY
Qty: 90 TABLET | Refills: 1 | Status: SHIPPED | OUTPATIENT
Start: 2024-11-05

## 2024-12-09 ENCOUNTER — VBI (OUTPATIENT)
Dept: ADMINISTRATIVE | Facility: OTHER | Age: 64
End: 2024-12-09

## 2024-12-09 NOTE — TELEPHONE ENCOUNTER
12/09/24 2:51 PM     Chart reviewed for CRC: Colonoscopy was/were not submitted to the patient's insurance.     Anamaria Lainez MA   [facility]

## 2025-03-28 ENCOUNTER — RA CDI HCC (OUTPATIENT)
Dept: OTHER | Facility: HOSPITAL | Age: 65
End: 2025-03-28

## 2025-03-28 NOTE — PROGRESS NOTES
HCC coding opportunities          Chart Reviewed number of suggestions sent to Provider: 1  E11.36     Patients Insurance        Commercial Insurance: [employer]

## 2025-04-04 ENCOUNTER — OFFICE VISIT (OUTPATIENT)
Dept: FAMILY MEDICINE CLINIC | Facility: CLINIC | Age: 65
End: 2025-04-04
Payer: COMMERCIAL

## 2025-04-04 ENCOUNTER — APPOINTMENT (OUTPATIENT)
Dept: LAB | Facility: CLINIC | Age: 65
End: 2025-04-04
Payer: COMMERCIAL

## 2025-04-04 VITALS
HEIGHT: 75 IN | WEIGHT: 286.3 LBS | BODY MASS INDEX: 35.6 KG/M2 | HEART RATE: 73 BPM | SYSTOLIC BLOOD PRESSURE: 135 MMHG | TEMPERATURE: 96.5 F | OXYGEN SATURATION: 98 % | DIASTOLIC BLOOD PRESSURE: 64 MMHG

## 2025-04-04 DIAGNOSIS — E78.5 DYSLIPIDEMIA: ICD-10-CM

## 2025-04-04 DIAGNOSIS — Z23 ENCOUNTER FOR IMMUNIZATION: ICD-10-CM

## 2025-04-04 DIAGNOSIS — Z12.11 COLON CANCER SCREENING: ICD-10-CM

## 2025-04-04 DIAGNOSIS — Z00.00 ANNUAL PHYSICAL EXAM: Primary | ICD-10-CM

## 2025-04-04 DIAGNOSIS — E11.9 TYPE 2 DIABETES MELLITUS WITHOUT COMPLICATION, WITHOUT LONG-TERM CURRENT USE OF INSULIN (HCC): ICD-10-CM

## 2025-04-04 LAB
ALBUMIN SERPL BCG-MCNC: 4.9 G/DL (ref 3.5–5)
ALP SERPL-CCNC: 53 U/L (ref 34–104)
ALT SERPL W P-5'-P-CCNC: 43 U/L (ref 7–52)
ANION GAP SERPL CALCULATED.3IONS-SCNC: 9 MMOL/L (ref 4–13)
AST SERPL W P-5'-P-CCNC: 47 U/L (ref 13–39)
BILIRUB SERPL-MCNC: 1.96 MG/DL (ref 0.2–1)
BUN SERPL-MCNC: 13 MG/DL (ref 5–25)
CALCIUM SERPL-MCNC: 10.6 MG/DL (ref 8.4–10.2)
CHLORIDE SERPL-SCNC: 101 MMOL/L (ref 96–108)
CHOLEST SERPL-MCNC: 109 MG/DL (ref ?–200)
CO2 SERPL-SCNC: 31 MMOL/L (ref 21–32)
CREAT SERPL-MCNC: 0.99 MG/DL (ref 0.6–1.3)
EST. AVERAGE GLUCOSE BLD GHB EST-MCNC: 131 MG/DL
GFR SERPL CREATININE-BSD FRML MDRD: 80 ML/MIN/1.73SQ M
GLUCOSE P FAST SERPL-MCNC: 140 MG/DL (ref 65–99)
HBA1C MFR BLD: 6.2 %
HDLC SERPL-MCNC: 31 MG/DL
LDLC SERPL CALC-MCNC: 52 MG/DL (ref 0–100)
POTASSIUM SERPL-SCNC: 4.1 MMOL/L (ref 3.5–5.3)
PROT SERPL-MCNC: 7.7 G/DL (ref 6.4–8.4)
SODIUM SERPL-SCNC: 141 MMOL/L (ref 135–147)
TRIGL SERPL-MCNC: 128 MG/DL (ref ?–150)

## 2025-04-04 PROCEDURE — 90471 IMMUNIZATION ADMIN: CPT

## 2025-04-04 PROCEDURE — 36415 COLL VENOUS BLD VENIPUNCTURE: CPT

## 2025-04-04 PROCEDURE — 80053 COMPREHEN METABOLIC PANEL: CPT

## 2025-04-04 PROCEDURE — 80061 LIPID PANEL: CPT

## 2025-04-04 PROCEDURE — 99396 PREV VISIT EST AGE 40-64: CPT | Performed by: FAMILY MEDICINE

## 2025-04-04 PROCEDURE — 83036 HEMOGLOBIN GLYCOSYLATED A1C: CPT

## 2025-04-04 PROCEDURE — 90677 PCV20 VACCINE IM: CPT

## 2025-04-04 NOTE — ASSESSMENT & PLAN NOTE
This is a 64-year-old white male who presents to the office today for his annual physical.  The patient is doing well and has no complaints.  He is working full-time from home.  He is exercising regularly.  He is trying to watch his diet.  He checks his blood sugars regularly at home.  Although he did not have a blood sugar log book with him, he was able to tell me that his blood sugars run between 110-130.  He has been compliant with his medications.  On exam today, the patient's blood pressure was under excellent control.  I checked his blood pressure myself today and found it to be 130/62.  I recommended no change with his current blood pressure medications.  Diabetes sounds to be well-controlled.  Patient did not fast today so I sent him [place] the lab [place] for routine fasting blood work.  We will reassess his fasting blood glucose and A1c.  Continue metformin 500 mg twice daily.  We did discuss routine diabetic footcare today.  I reviewed the patient's immunization records.  I did recommend Prevnar 20 for the patient.  Patient was agreeable and the vaccine was administered.  I do recommend that he schedule an appointment for Shingrix vaccine at a local pharmacy.

## 2025-04-04 NOTE — PATIENT INSTRUCTIONS
"Patient Education     Foot care for people with diabetes   The Basics   Written by the doctors and editors at [facility]   Why is foot care important if I have diabetes? -- Diabetes can cause nerve damage if your blood sugar is high for a long time. The medical term for this is \"diabetic neuropathy.\"  If you have problems with the nerves in your feet, you might not be able to feel pain in your foot. Normally, people feel pain when they get a cut or a blister on their foot. The pain tells them that they need to treat their cut so it can heal. But people with nerve damage might not feel any pain when their feet get hurt. They might not even know that they have a cut, so they might not treat it. Problems that aren't treated right away can get much worse. For example, an untreated cut can get infected and turn into an open sore.  High blood sugar can also damage blood vessels and decrease blood flow to your feet. This can weaken your skin and make wounds take longer to heal. You are also more likely to get an infection if you have high blood sugar.  How do I take care of my feet? -- Taking good care of your feet can help prevent foot problems. You should:   Wash your feet every day with soap and warm water. Pat your feet dry, and be sure to dry the skin between your toes.   Keep your feet moisturized. Put lotion on the tops and bottoms of your feet, but not between your toes.   Check your feet every day (figure 1). Look for cuts, blisters, redness, or swelling. Use a mirror, or ask someone to help you check the bottoms of your feet. Check all parts of the foot, especially between the toes. Look for broken skin, ulcers, blisters, or redness.   Trim your toenails straight across when needed (figure 2). Do not cut the corners of your toenails. File rough edges. Do not cut your cuticles. Ask for help if you cannot see well or have problems reaching your feet.   Ask your doctor or nurse to check your feet at each visit. Take " your shoes and socks off for these checks.   See a foot care provider (such as a podiatrist) if you have an ingrown toenail, corn, or callus. Do not try to remove corns and calluses yourself.  How do I protect my feet from injury? -- There are several ways to protect your feet. You can:   Wear shoes and socks at all times, even at home. Do not walk barefoot. Wear swim shoes if you go to the beach or a swimming pool.   Choose shoes that fit well. They should not be not too tight or too loose. Your shoes should have plenty of room for your toes (figure 3). Your doctor might give you a prescription for special shoes. Check to see if they are covered by your insurance.   Check your shoes each time before you put them on to make sure that the lining is smooth. Also check to make sure that there is nothing inside the shoes before putting them on.   Do not wear shoes that expose any part of the foot, like sandals, thongs, or clogs.   Wear cotton socks that fit loosely. Do not wear shoes without socks.   Protect your feet from heat and cold. Test bath water before putting your feet in it to make sure that it is not too hot. Do not walk barefoot on hot ground. Take extra care when going outside in the cold and wear warm socks.  What else should I know? -- You can lower your risk for foot problems by keeping your blood sugar levels as close to your goal as possible. Other things you can do include:   Move your ankles and toes often to help with blood flow. You can wear a support stocking to help with swelling.   Walk often. Regular walking helps blood flow.   If you smoke, try to quit. Your doctor or nurse can help. Smoking causes poor blood flow to your feet and can damage your nerves.  When should I call the doctor? -- Call your doctor or nurse for advice if you have:   A fever of 100.4°F (38°C) or higher, chills, or a wound that will not heal   Swelling, redness, warmth around a wound, a foul smell coming from a wound, or  yellowish, greenish, or bloody discharge   Sores or blisters on your feet that hurt more or less than you would expect   Numbness or tingling in your foot or leg   Corns, calluses, blisters, or new sores on your foot   Very dry, scaly, or cracked skin on your feet   Changes in the way your foot joints or arch look  All topics are updated as new evidence becomes available and our peer review process is complete.  This topic retrieved from [employer] on: Mar 13, 2024.  Topic 696485 Version 2.0  Release: 32.2.4 - C32.71  © 2024 UpToDate, Inc. and/or its affiliates. All rights reserved.  figure 1: Foot check for people with diabetes     People with diabetes should check both of their feet every day. It is important to check your feet all over, including in between your toes. If you can't see the bottom of your foot, use a mirror or ask another person to check for you. Let your doctor or nurse know if you find any:  Redness   Cuts or cracks in the skin   Blisters   Swelling   Graphic 37911 Version 3.0  figure 2: Trim your toenails     Trim your toenails straight across and smooth them with a nail file.  Graphic 94327 Version 2.0  figure 3: Correct shoe shape     Choose shoes that fit the right way and are not too tight or too loose. Your shoes should have plenty of room for your toes.  Graphic 83022 Version 2.0  Consumer Information Use and Disclaimer   Disclaimer: This generalized information is a limited summary of diagnosis, treatment, and/or medication information. It is not meant to be comprehensive and should be used as a tool to help the user understand and/or assess potential diagnostic and treatment options. It does NOT include all information about conditions, treatments, medications, side effects, or risks that may apply to a specific patient. It is not intended to be medical advice or a substitute for the medical advice, diagnosis, or treatment of a health care provider based on the health care provider's  "examination and assessment of a patient's specific and unique circumstances. Patients must speak with a health care provider for complete information about their health, medical questions, and treatment options, including any risks or benefits regarding use of medications. This information does not endorse any treatments or medications as safe, effective, or approved for treating a specific patient. UpToDate, Inc. and its affiliates disclaim any warranty or liability relating to this information or the use thereof.The use of this information is governed by the Terms of Use, available at https://www.Talknote.com/en/know/clinical-effectiveness-terms. 2024© UpToDate, Inc. and its affiliates and/or licensors. All rights reserved.  Copyright   © 2024 UpToDate, Inc. and/or its affiliates. All rights reserved.    Patient Education     Routine physical for adults   The Basics   Written by the doctors and editors at [facility]   What is a physical? -- A physical is a routine visit, or \"check-up,\" with your doctor. You might also hear it called a \"wellness visit\" or \"preventive visit.\"  During each visit, the doctor will:   Ask about your physical and mental health   Ask about your habits, behaviors, and lifestyle   Do an exam   Give you vaccines if needed   Talk to you about any medicines you take   Give advice about your health   Answer your questions  Getting regular check-ups is an important part of taking care of your health. It can help your doctor find and treat any problems you have. But it's also important for preventing health problems.  A routine physical is different from a \"sick visit.\" A sick visit is when you see a doctor because of a health concern or problem. Since physicals are scheduled ahead of time, you can think about what you want to ask the doctor.  How often should I get a physical? -- It depends on your age and health. In general, for people age 21 years and older:   If you are younger than 50 " "years, you might be able to get a physical every 3 years.   If you are 50 years or older, your doctor might recommend a physical every year.  If you have an ongoing health condition, like diabetes or high blood pressure, your doctor will probably want to see you more often.  What happens during a physical? -- In general, each visit will include:   Physical exam - The doctor or nurse will check your height, weight, heart rate, and blood pressure. They will also look at your eyes and ears. They will ask about how you are feeling and whether you have any symptoms that bother you.   Medicines - It's a good idea to bring a list of all the medicines you take to each doctor visit. Your doctor will talk to you about your medicines and answer any questions. Tell them if you are having any side effects that bother you. You should also tell them if you are having trouble paying for any of your medicines.   Habits and behaviors - This includes:   Your diet   Your exercise habits   Whether you smoke, drink alcohol, or use drugs   Whether you are sexually active   Whether you feel safe at home  Your doctor will talk to you about things you can do to improve your health and lower your risk of health problems. They will also offer help and support. For example, if you want to quit smoking, they can give you advice and might prescribe medicines. If you want to improve your diet or get more physical activity, they can help you with this, too.   Lab tests, if needed - The tests you get will depend on your age and situation. For example, your doctor might want to check your:   Cholesterol   Blood sugar   Iron level   Vaccines - The recommended vaccines will depend on your age, health, and what vaccines you already had. Vaccines are very important because they can prevent certain serious or deadly infections.   Discussion of screening - \"Screening\" means checking for diseases or other health problems before they cause symptoms. Your " doctor can recommend screening based on your age, risk, and preferences. This might include tests to check for:   Cancer, such as breast, prostate, cervical, ovarian, colorectal, prostate, lung, or skin cancer   Sexually transmitted infections, such as chlamydia and gonorrhea   Mental health conditions like depression and anxiety  Your doctor will talk to you about the different types of screening tests. They can help you decide which screenings to have. They can also explain what the results might mean.   Answering questions - The physical is a good time to ask the doctor or nurse questions about your health. If needed, they can refer you to other doctors or specialists, too.  Adults older than 65 years often need other care, too. As you get older, your doctor will talk to you about:   How to prevent falling at home   Hearing or vision tests   Memory testing   How to take your medicines safely   Making sure that you have the help and support you need at home  All topics are updated as new evidence becomes available and our peer review process is complete.  This topic retrieved from [employer] on: May 02, 2024.  Topic 283776 Version 1.0  Release: 32.4.3 - C32.122  © 2024 UpToDate, Inc. and/or its affiliates. All rights reserved.  Consumer Information Use and Disclaimer   Disclaimer: This generalized information is a limited summary of diagnosis, treatment, and/or medication information. It is not meant to be comprehensive and should be used as a tool to help the user understand and/or assess potential diagnostic and treatment options. It does NOT include all information about conditions, treatments, medications, side effects, or risks that may apply to a specific patient. It is not intended to be medical advice or a substitute for the medical advice, diagnosis, or treatment of a health care provider based on the health care provider's examination and assessment of a patient's specific and unique circumstances.  Patients must speak with a health care provider for complete information about their health, medical questions, and treatment options, including any risks or benefits regarding use of medications. This information does not endorse any treatments or medications as safe, effective, or approved for treating a specific patient. UpToDate, Inc. and its affiliates disclaim any warranty or liability relating to this information or the use thereof.The use of this information is governed by the Terms of Use, available at https://www.woltersSaiguouwer.com/en/know/clinical-effectiveness-terms. 2024© UpToDate, Inc. and its affiliates and/or licensors. All rights reserved.  Copyright   © 2024 UpToDate, Inc. and/or its affiliates. All rights reserved.

## 2025-04-04 NOTE — ASSESSMENT & PLAN NOTE
Lab Results   Component Value Date    HGBA1C 6.2 (H) 04/04/2025       Orders:    Hemoglobin A1C; Future    Comprehensive metabolic panel; Future

## 2025-04-04 NOTE — PROGRESS NOTES
Adult Annual Physical  Name: Gustavo Moore      : 1960      MRN: [id number]  Encounter Provider: Elías Cottrell DO  Encounter Date: 2025   Encounter department: [facility]    :  Assessment & Plan  Annual physical exam  This is a 64-year-old white male who presents to the office today for his annual physical.  The patient is doing well and has no complaints.  He is working full-time from home.  He is exercising regularly.  He is trying to watch his diet.  He checks his blood sugars regularly at home.  Although he did not have a blood sugar log book with him, he was able to tell me that his blood sugars run between 110-130.  He has been compliant with his medications.  On exam today, the patient's blood pressure was under excellent control.  I checked his blood pressure myself today and found it to be 130/62.  I recommended no change with his current blood pressure medications.  Diabetes sounds to be well-controlled.  Patient did not fast today so I sent him to the lab [place] for routine fasting blood work.  We will reassess his fasting blood glucose and A1c.  Continue metformin 500 mg twice daily.  We did discuss routine diabetic footcare today.  I reviewed the patient's immunization records.  I did recommend Prevnar 20 for the patient.  Patient was agreeable and the vaccine was administered.  I do recommend that he schedule an appointment for Shingrix vaccine at a local pharmacy.       Dyslipidemia    Orders:    Lipid Panel with Direct LDL reflex; Future    Type 2 diabetes mellitus without complication, without long-term current use of insulin (HCC)    Lab Results   Component Value Date    HGBA1C 6.2 (H) 2025       Orders:    Hemoglobin A1C; Future    Comprehensive metabolic panel; Future    Colon cancer screening    Orders:    Cologuard    Encounter for immunization    Orders:    Pneumococcal Conjugate Vaccine 20-valent (Pcv20)        Preventive Screenings:  - Diabetes  Screening: screening not indicated and has diabetes  - Cholesterol Screening: has hyperlipidemia   - Hepatitis C screening: screening up-to-date   - Colon cancer screening: risks/benefits discussed and orders placed   - Prostate cancer screening: screening up-to-date     Immunizations:  - Immunizations due: Tdap and Zoster (Shingrix)    Counseling/Anticipatory Guidance:    - Diet: discussed recommendations for a healthy/well-balanced diet.   - Exercise: the importance of regular exercise/physical activity was discussed. Recommend exercise 3-5 times per week for at least 30 minutes.   - Injury prevention: discussed safety/seat belts, safety helmets, smoke detectors, carbon monoxide detectors, and smoking near bedding or upholstery.       Depression Screening and Follow-up Plan: Patient was screened for depression during today's encounter. They screened negative with a PHQ-2 score of 0.          History of Present Illness     Adult Annual Physical:  Patient presents for annual physical.     Diet and Physical Activity:  - Diet/Nutrition: diabetic diet.  - Exercise: walking.    Depression Screening:  - PHQ-2 Score: 0    General Health:  - Sleep: sleeps well and 4-6 hours of sleep on average.  - Hearing: normal hearing bilateral ears.  - Vision: wears glasses.  - Dental: regular dental visits, brushes teeth once daily and floss regularly.     Health:  - History of STDs: no.   - Urinary symptoms: erectile dysfunction.     Advanced Care Planning:  - Has an advanced directive?: no    - Has a durable medical POA?: no      Review of Systems   Constitutional:  Negative for activity change and appetite change.   Cardiovascular:  Negative for chest pain, palpitations and leg swelling.   Gastrointestinal:  Negative for abdominal distention, abdominal pain, blood in stool, constipation, diarrhea and nausea.   Genitourinary:         Denies nocturia and weak urinary stream         Objective   /64   Pulse 73   Temp (!) 96.5  "°F (35.8 °C) (Tympanic)   Ht 6' 3\" (1.905 m)   Wt 130 kg (286 lb 4.8 oz)   SpO2 98%   BMI 35.79 kg/m²     Physical Exam  Vitals reviewed.   Constitutional:       Comments: This is a pleasant 64-year-old white male who appears his stated age.  The patient is nonseptic in appearance and in no apparent distress   HENT:      Head: Normocephalic and atraumatic.      Right Ear: Tympanic membrane, ear canal and external ear normal. There is no impacted cerumen.      Left Ear: Tympanic membrane, ear canal and external ear normal. There is no impacted cerumen.      Mouth/Throat:      Mouth: Mucous membranes are moist.      Pharynx: Oropharynx is clear. No oropharyngeal exudate or posterior oropharyngeal erythema.   Eyes:      General: No scleral icterus.        Right eye: No discharge.         Left eye: No discharge.      Conjunctiva/sclera: Conjunctivae normal.      Pupils: Pupils are equal, round, and reactive to light.   Neck:      Vascular: No carotid bruit.      Comments: There is no thyromegaly  Cardiovascular:      Rate and Rhythm: Normal rate and regular rhythm.      Pulses: no weak pulses.           Dorsalis pedis pulses are 2+ on the right side and 2+ on the left side.        Posterior tibial pulses are 2+ on the right side and 2+ on the left side.      Heart sounds: Normal heart sounds. No murmur heard.     No friction rub. No gallop.   Pulmonary:      Effort: Pulmonary effort is normal. No respiratory distress.      Breath sounds: Normal breath sounds. No stridor. No wheezing, rhonchi or rales.   Abdominal:      General: Bowel sounds are normal. There is no distension.      Palpations: Abdomen is soft. There is no mass.      Tenderness: There is no abdominal tenderness. There is no guarding.      Comments: There was no hepatosplenomegaly   Musculoskeletal:      Cervical back: Neck supple.      Right lower leg: No edema.      Left lower leg: No edema.   Feet:      Right foot:      Skin integrity: No ulcer, " skin breakdown, erythema, warmth, callus or dry skin.      Left foot:      Skin integrity: No ulcer, skin breakdown, erythema, warmth, callus or dry skin.   Lymphadenopathy:      Cervical: No cervical adenopathy.   Psychiatric:         Mood and Affect: Mood normal.         Behavior: Behavior normal.         Thought Content: Thought content normal.         Judgment: Judgment normal.       Patient's shoes and socks removed.    Right Foot/Ankle   Right Foot Inspection  Skin Exam: skin normal and skin intact. No dry skin, no warmth, no callus, no erythema, no maceration, no abnormal color, no pre-ulcer, no ulcer and no callus.     Toe Exam: No swelling, no tenderness, erythema and  no right toe deformity    Sensory   Vibration: intact  Proprioception: intact  Monofilament testing: intact    Vascular  Capillary refills: < 3 seconds  The right DP pulse is 2+. The right PT pulse is 2+.     Left Foot/Ankle  Left Foot Inspection  Skin Exam: skin normal and skin intact. No dry skin, no warmth, no erythema, no maceration, normal color, no pre-ulcer, no ulcer and no callus.     Toe Exam: No swelling, no tenderness, no erythema and no left toe deformity.     Sensory   Vibration: intact  Proprioception: intact  Monofilament testing: intact    Vascular  Capillary refills: < 3 seconds  The left DP pulse is 2+. The left PT pulse is 2+.     Assign Risk Category  No deformity present  No loss of protective sensation  No weak pulses  Risk: 0

## 2025-04-07 ENCOUNTER — RESULTS FOLLOW-UP (OUTPATIENT)
Dept: FAMILY MEDICINE CLINIC | Facility: CLINIC | Age: 65
End: 2025-04-07

## 2025-04-07 DIAGNOSIS — E83.52 HYPERCALCEMIA: Primary | ICD-10-CM

## 2025-04-07 DIAGNOSIS — I10 ESSENTIAL HYPERTENSION, BENIGN: ICD-10-CM

## 2025-04-07 NOTE — TELEPHONE ENCOUNTER
----- Message from Elías Cottrell DO sent at 4/7/2025  8:56 AM EDT -----  Fasting blood sugar is 140 (<120 is goal), Hgba1c is 6.2 (excellent). T Chol 129 (<200). Triglycerides 128 (<150). LDL Chol 52 (<70). Only abnormality is calcium is 10.6 (8.4-10.2). I recommend rechecking this and a PTH level is 1 month. I will put t  his in the computer. Have him go to a [facility]'s lab and they can do it. He does not need to fast.

## 2025-04-08 RX ORDER — LISINOPRIL 40 MG/1
40 TABLET ORAL DAILY
Qty: 90 TABLET | Refills: 1 | Status: SHIPPED | OUTPATIENT
Start: 2025-04-08

## 2025-04-22 DIAGNOSIS — I10 ESSENTIAL HYPERTENSION, BENIGN: ICD-10-CM

## 2025-04-23 RX ORDER — AMLODIPINE BESYLATE 5 MG/1
5 TABLET ORAL DAILY
Qty: 90 TABLET | Refills: 1 | Status: SHIPPED | OUTPATIENT
Start: 2025-04-23

## 2025-04-27 LAB — COLOGUARD RESULT REPORTABLE: NEGATIVE

## 2025-05-02 DIAGNOSIS — E78.5 DYSLIPIDEMIA: ICD-10-CM

## 2025-05-02 DIAGNOSIS — E11.9 TYPE 2 DIABETES MELLITUS WITHOUT COMPLICATION, WITHOUT LONG-TERM CURRENT USE OF INSULIN (HCC): ICD-10-CM

## 2025-05-02 DIAGNOSIS — I10 ESSENTIAL HYPERTENSION, BENIGN: ICD-10-CM

## 2025-05-02 RX ORDER — BLOOD SUGAR DIAGNOSTIC
STRIP MISCELLANEOUS
Qty: 100 STRIP | Refills: 1 | Status: SHIPPED | OUTPATIENT
Start: 2025-05-02

## 2025-05-02 RX ORDER — ROSUVASTATIN CALCIUM 5 MG/1
5 TABLET, COATED ORAL DAILY
Qty: 90 TABLET | Refills: 1 | Status: SHIPPED | OUTPATIENT
Start: 2025-05-02

## 2025-05-02 RX ORDER — LANCETS 33 GAUGE
EACH MISCELLANEOUS
Qty: 100 EACH | Refills: 1 | Status: SHIPPED | OUTPATIENT
Start: 2025-05-02

## 2025-05-02 RX ORDER — HYDROCHLOROTHIAZIDE 25 MG/1
25 TABLET ORAL DAILY
Qty: 90 TABLET | Refills: 1 | Status: SHIPPED | OUTPATIENT
Start: 2025-05-02

## 2025-05-04 PROBLEM — Z12.11 COLON CANCER SCREENING: Status: RESOLVED | Noted: 2020-06-08 | Resolved: 2025-05-04
